# Patient Record
Sex: MALE | Race: WHITE | NOT HISPANIC OR LATINO | Employment: FULL TIME | URBAN - METROPOLITAN AREA
[De-identification: names, ages, dates, MRNs, and addresses within clinical notes are randomized per-mention and may not be internally consistent; named-entity substitution may affect disease eponyms.]

---

## 2017-01-13 ENCOUNTER — TELEPHONE (OUTPATIENT)
Dept: MEDICAL GROUP | Facility: MEDICAL CENTER | Age: 32
End: 2017-01-13

## 2017-01-18 NOTE — TELEPHONE ENCOUNTER
Called pt informed him that the letter he requested was ready. Pt would like to pick it up on 01/18/17 rather than having it mailed.

## 2017-03-10 ENCOUNTER — APPOINTMENT (OUTPATIENT)
Dept: ADMISSIONS | Facility: MEDICAL CENTER | Age: 32
End: 2017-03-10
Attending: OTOLARYNGOLOGY
Payer: MEDICAID

## 2017-03-15 ENCOUNTER — HOSPITAL ENCOUNTER (OUTPATIENT)
Facility: MEDICAL CENTER | Age: 32
End: 2017-03-15
Attending: OTOLARYNGOLOGY | Admitting: OTOLARYNGOLOGY
Payer: MEDICAID

## 2017-03-15 VITALS
HEIGHT: 68 IN | SYSTOLIC BLOOD PRESSURE: 121 MMHG | RESPIRATION RATE: 14 BRPM | DIASTOLIC BLOOD PRESSURE: 80 MMHG | TEMPERATURE: 97.1 F | OXYGEN SATURATION: 94 % | BODY MASS INDEX: 27.57 KG/M2 | WEIGHT: 181.88 LBS | HEART RATE: 65 BPM

## 2017-03-15 PROBLEM — D14.0: Status: ACTIVE | Noted: 2017-03-15

## 2017-03-15 PROCEDURE — 502240 HCHG MISC OR SUPPLY RC 0272: Performed by: OTOLARYNGOLOGY

## 2017-03-15 PROCEDURE — 500125 HCHG BOVIE, HANDLE: Performed by: OTOLARYNGOLOGY

## 2017-03-15 PROCEDURE — 160002 HCHG RECOVERY MINUTES (STAT): Performed by: OTOLARYNGOLOGY

## 2017-03-15 PROCEDURE — A9270 NON-COVERED ITEM OR SERVICE: HCPCS

## 2017-03-15 PROCEDURE — 700111 HCHG RX REV CODE 636 W/ 250 OVERRIDE (IP)

## 2017-03-15 PROCEDURE — 110382 HCHG SHELL REV 271: Performed by: OTOLARYNGOLOGY

## 2017-03-15 PROCEDURE — 700102 HCHG RX REV CODE 250 W/ 637 OVERRIDE(OP)

## 2017-03-15 PROCEDURE — 502573 HCHG PACK, ENT: Performed by: OTOLARYNGOLOGY

## 2017-03-15 PROCEDURE — 160035 HCHG PACU - 1ST 60 MINS PHASE I: Performed by: OTOLARYNGOLOGY

## 2017-03-15 PROCEDURE — 160009 HCHG ANES TIME/MIN: Performed by: OTOLARYNGOLOGY

## 2017-03-15 PROCEDURE — 160048 HCHG OR STATISTICAL LEVEL 1-5: Performed by: OTOLARYNGOLOGY

## 2017-03-15 PROCEDURE — 88305 TISSUE EXAM BY PATHOLOGIST: CPT | Mod: 59

## 2017-03-15 PROCEDURE — 500126 HCHG BOVIE, NEEDLE TIP: Performed by: OTOLARYNGOLOGY

## 2017-03-15 PROCEDURE — 700101 HCHG RX REV CODE 250

## 2017-03-15 PROCEDURE — A4606 OXYGEN PROBE USED W OXIMETER: HCPCS | Performed by: OTOLARYNGOLOGY

## 2017-03-15 PROCEDURE — 160028 HCHG SURGERY MINUTES - 1ST 30 MINS LEVEL 3: Performed by: OTOLARYNGOLOGY

## 2017-03-15 PROCEDURE — 110371 HCHG SHELL REV 272: Performed by: OTOLARYNGOLOGY

## 2017-03-15 PROCEDURE — 160036 HCHG PACU - EA ADDL 30 MINS PHASE I: Performed by: OTOLARYNGOLOGY

## 2017-03-15 RX ORDER — HYDROCODONE BITARTRATE AND ACETAMINOPHEN 5; 325 MG/1; MG/1
1-2 TABLET ORAL EVERY 4 HOURS PRN
Qty: 20 TAB | Refills: 0 | Status: ON HOLD | OUTPATIENT
Start: 2017-03-15 | End: 2017-07-19

## 2017-03-15 RX ORDER — SODIUM CHLORIDE, SODIUM LACTATE, POTASSIUM CHLORIDE, CALCIUM CHLORIDE 600; 310; 30; 20 MG/100ML; MG/100ML; MG/100ML; MG/100ML
1000 INJECTION, SOLUTION INTRAVENOUS
Status: COMPLETED | OUTPATIENT
Start: 2017-03-15 | End: 2017-03-15

## 2017-03-15 RX ORDER — LIDOCAINE HYDROCHLORIDE AND EPINEPHRINE 10; 10 MG/ML; UG/ML
INJECTION, SOLUTION INFILTRATION; PERINEURAL
Status: DISCONTINUED
Start: 2017-03-15 | End: 2017-03-15 | Stop reason: HOSPADM

## 2017-03-15 RX ORDER — LIDOCAINE HYDROCHLORIDE AND EPINEPHRINE 10; 10 MG/ML; UG/ML
INJECTION, SOLUTION INFILTRATION; PERINEURAL
Status: DISCONTINUED | OUTPATIENT
Start: 2017-03-15 | End: 2017-03-15 | Stop reason: HOSPADM

## 2017-03-15 RX ADMIN — SODIUM CHLORIDE, SODIUM LACTATE, POTASSIUM CHLORIDE, CALCIUM CHLORIDE 1000 ML: 600; 310; 30; 20 INJECTION, SOLUTION INTRAVENOUS at 07:56

## 2017-03-15 ASSESSMENT — PAIN SCALES - GENERAL
PAINLEVEL_OUTOF10: 0

## 2017-03-15 NOTE — OP REPORT
DATE OF SERVICE:  03/15/2017    PREOPERATIVE DIAGNOSIS:  Left nasal papillomatosis.    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    PROCEDURES:  Bilateral nasal endoscopy, right middle meatus polyp removal and   left excision of papilloma with cautery of base.    DESCRIPTION OF PROCEDURE:  The patient was given general anesthesia, properly   anesthetized, LMA was placed, had removal of a large nasal papilloma, mostly   emanated from the septum and the inferior floor of the nose.  It was   completely excised and then the base was cauterized.  There was slight   bleeding, no more than 10 mL altogether.  The right side then had a nasal   endoscopy done and there was a small polyp at the anterior middle meatus, it   was removed.  I did not see papilloma per se and I did not see any findings   otherwise of sinusitis such as drainage other polyp, etc.  Left nasal   endoscopy was done, the middle meatus appeared to be normal and there were no   lesions.  The papilloma was relatively anterior and had been removed and   cauterized.  At this point, there was no further active bleeding.  She was   taken to recovery room in excellent condition.       ____________________________________     MD MARCK ORTA / SARAH    DD:  03/15/2017 09:33:47  DT:  03/15/2017 09:53:24    D#:  [id number]  Job#:  [id number]

## 2017-03-15 NOTE — PROGRESS NOTES
0932-received pt from OR with report from Dr Mcarthur. VSS. Oral airway in place, respirations unlabored. No bleeding/ drainage noted from nose.  1010-oral airway dc'd, HOB elevated. Pt drowsy, denies pain.  1040-pt continues to deny pain.  Weaned to RA.   1050-friend to bedside  1100-pt awake, alert, no bleeding/ drainage from nose.  Drip pad placed. Instructions reviewed with pt and friend express understanding  1115-pt expresses readiness for discharge, meets criteria. Dressed, IV dc'd. Discharged via wheelchair at 1120

## 2017-03-15 NOTE — OR SURGEON
Immediate Post-Operative Note      PreOp Diagnosis: Left nasal papilloma    PostOp Diagnosis: left nasal papilloma, right nasal polyp    Procedure(s): Bilateral nasal endoscopy, right polyp removal  MASS EXCISION GENERAL FOR PAPILLOMA - Wound Class: Clean Contaminated  EXAM UNDER ANESTHESIA SIDE OF NOSE - Wound Class: Clean Contaminated  NASAL POLYPECTOMY - Wound Class: Clean Contaminated    Surgeon(s):  Lobito Friedman M.D.    Anesthesiologist/Type of Anesthesia:  Anesthesiologist: Tyron Mcarthur M.D./General    Surgical Staff:  Circulator: Sherry Jacobo R.N.  Relief Circulator: Aye Sanchez R.N.  Relief Scrub: Raiza Keith R.N.  Scrub Person: Babs Garcia    Specimen: left nasal papilloma, right nasal polyp    Estimated Blood Loss: 10 cc    Findings: as above    Complications: none        3/15/2017 9:30 AM Lobito Friedman

## 2017-03-15 NOTE — IP AVS SNAPSHOT
3/15/2017          Jevon Mcclendon  [address]  [place] [address]    Dear Jevon:    [facility] wants to ensure your discharge home is safe and you or your loved ones have had all your questions answered regarding your care after you leave the hospital.    You may receive a telephone call within two days of your discharge.  This call is to make certain you understand your discharge instructions as well as ensure we provided you with the best care possible during your stay with us.     The call will only last approximately 3-5 minutes and will be done by a nurse.    Once again, we want to ensure your discharge home is safe and that you have a clear understanding of any next steps in your care.  If you have any questions or concerns, please do not hesitate to contact us, we are here for you.  Thank you for choosing [facility] for your healthcare needs.    Sincerely,    Mc Chanel    [facility]

## 2017-03-15 NOTE — IP AVS SNAPSHOT
" Home Care Instructions                                                                                                                Name:Jevon Mcclendon  Medical Record Number:[id number]  CSN: [id number]    YOB: 1985   Age: 31 y.o.  Sex: male  HT:1.727 m (5' 8\") WT: 82.5 kg (181 lb 14.1 oz)          Admit Date: 3/15/2017     Discharge Date:   Today's Date: 3/15/2017  Attending Doctor:  Lobito Friedman M.D.                  Allergies:  Clindamycin                Discharge Instructions         ACTIVITY: Rest and take it easy for the first 24 hours.  A responsible adult is recommended to remain with you during that time.  It is normal to feel sleepy.  We encourage you to not do anything that requires balance, judgment or coordination.    MILD FLU-LIKE SYMPTOMS ARE NORMAL. YOU MAY EXPERIENCE GENERALIZED MUSCLE ACHES, THROAT IRRITATION, HEADACHE AND/OR SOME NAUSEA.    FOR 24 HOURS DO NOT:  Drive, operate machinery or run household appliances.  Drink beer or alcoholic beverages.   Make important decisions or sign legal documents.    SPECIAL INSTRUCTIONS: *Return to ER for SOB, serious bleeding or dehydration.  keep upright for 24 to 48 hours. Notify MD for any problems**    DIET: To avoid nausea, slowly advance diet as tolerated, avoiding spicy or greasy foods for the first day.  Add more substantial food to your diet according to your physician's instructions.  Babies can be fed formula or breast milk as soon as they are hungry.  INCREASE FLUIDS AND FIBER TO AVOID CONSTIPATION.    SURGICAL DRESSING/BATHING:*change drip pad as needed, no nose blowing for 72 hours**    FOLLOW-UP APPOINTMENT:  A follow-up appointment should be arranged with your doctor in *2 weeks**; call to schedule.    You should CALL YOUR PHYSICIAN if you develop:  Fever greater than 101 degrees F.  Pain not relieved by medication, or persistent nausea or vomiting.  Excessive bleeding (blood soaking through dressing) or unexpected " drainage from the wound.  Extreme redness or swelling around the incision site, drainage of pus or foul smelling drainage.  Inability to urinate or empty your bladder within 8 hours.  Problems with breathing or chest pain.    You should call 911 if you develop problems with breathing or chest pain.  If you are unable to contact your doctor or surgical center, you should go to the nearest emergency room or urgent care center.  Physician's telephone #: **[phone]*    If any questions arise, call your doctor.  If your doctor is not available, please feel free to call the Surgical Center at [phone].  The Center is open Monday through Friday from 7AM to 7PM.  You can also call the HEALTH HOTLINE open 24 hours/day, 7 days/week and speak to a nurse at [phone], or toll free at [phone].    A registered nurse may call you a few days after your surgery to see how you are doing after your procedure.    MEDICATIONS: Resume taking daily medication.  Take prescribed pain medication with food.  If no medication is prescribed, you may take non-aspirin pain medication if needed.  PAIN MEDICATION CAN BE VERY CONSTIPATING.  Take a stool softener or laxative such as senokot, pericolace, or milk of magnesia if needed.    Prescription given for **Lortab*.  Last pain medication given at *_________**.    If your physician has prescribed pain medication that includes Acetaminophen (Tylenol), do not take additional Acetaminophen (Tylenol) while taking the prescribed medication.    Depression / Suicide Risk    As you are discharged from this [facility] facility, it is important to learn how to keep safe from harming yourself.    Recognize the warning signs:  · Abrupt changes in personality, positive or negative- including increase in energy   · Giving away possessions  · Change in eating patterns- significant weight changes-  positive or negative  · Change in sleeping patterns- unable to sleep or sleeping all the  time   · Unwillingness or inability to communicate  · Depression  · Unusual sadness, discouragement and loneliness  · Talk of wanting to die  · Neglect of personal appearance   · Rebelliousness- reckless behavior  · Withdrawal from people/activities they love  · Confusion- inability to concentrate     If you or a loved one observes any of these behaviors or has concerns about self-harm, here's what you can do:  · Talk about it- your feelings and reasons for harming yourself  · Remove any means that you might use to hurt yourself (examples: pills, rope, extension cords, firearm)  · Get professional help from the community (Mental Health, Substance Abuse, psychological counseling)  · Do not be alone:Call your Safe Contact- someone whom you trust who will be there for you.  · Call your local CRISIS HOTLINE 780-8604 or 486-675-1337  · Call your local Children's Mobile Crisis Response Team [place] (903) 809-8548 or www.[employer]  · Call the toll free National Suicide Prevention Hotlines   · National Suicide Prevention Lifeline 757-091-XTKV (8204)  · Next One's On Me (NO[facility]) Hope Line Network 800-SUICIDE (794-6614)       Medication List      START taking these medications        Instructions    hydrocodone-acetaminophen 5-325 MG Tabs per tablet   Commonly known as:  LORTAB    Take 1-2 Tabs by mouth every four hours as needed.   Dose:  1-2 Tab         CONTINUE taking these medications        Instructions    acetaminophen/caffeine/butalbital 325-40-50 mg -40 MG Tabs   Commonly known as:  FIORICET    Take 1 Tab by mouth every 6 hours as needed for Migraine.   Dose:  1 Tab       amitriptyline 25 MG Tabs   Commonly known as:  ELAVIL    Take 1 Tab by mouth every bedtime. To help prevent headaches.   Dose:  25 mg       loratadine 10 MG Tabs   Commonly known as:  CLARITIN    Take 1 Tab by mouth every day.   Dose:  10 mg               Medication Information     Above and/or attached are the medications your physician expects  you to take upon discharge. Review all of your home medications and newly ordered medications with your doctor and/or pharmacist. Follow medication instructions as directed by your doctor and/or pharmacist. Please keep your medication list with you and share with your physician. Update the information when medications are discontinued, doses are changed, or new medications (including over-the-counter products) are added; and carry medication information at all times in the event of emergency situations.        Resources     Quit Smoking / Tobacco Use:    I understand the use of any tobacco products increases my chance of suffering from future heart disease or stroke and could cause other illnesses which may shorten my life. Quitting the use of tobacco products is the single most important thing I can do to improve my health. For further information on smoking / tobacco cessation call a Toll Free Quit Line at 1-617.375.7789 (*[employer]) or 1-942.254.2710 (American Lung Association) or you can access the web based program at www.lungusa.org.    [place] Tobacco Users Help Line:  (711) 111-1099       Toll Free: 1-545.695.4622  Quit Tobacco Program [facility] (947)352-7679    Crisis Hotline:    [place] Crisis Hotline:  1-863-ETOFBLP or 1-159.183.1911    [place] Crisis Hotline:    1-722.848.8215 or 523-488-9571    Discharge Survey:   Thank you for choosing [facility]. We hope we did everything we could to make your hospital stay a pleasant one. You may be receiving a survey and we would appreciate your time and participation in answering the questions. Your input is very valuable to us in our efforts to improve our service to our patients and their families.            Signatures     My signature on this form indicates that:    1. I acknowledge receipt and understanding of these Home Care Instruction.  2. My questions regarding this information have been answered to my  satisfaction.  3. I have formulated a plan with my discharge nurse to obtain my prescribed medications for home.    __________________________________      __________________________________                   Patient Signature                                 Guardian/Responsible Adult Signature      __________________________________                 __________       ________                       Nurse Signature                                               Date                 Time

## 2017-07-17 ENCOUNTER — APPOINTMENT (OUTPATIENT)
Dept: ADMISSIONS | Facility: MEDICAL CENTER | Age: 32
End: 2017-07-17
Attending: OTOLARYNGOLOGY
Payer: MEDICAID

## 2017-07-19 ENCOUNTER — HOSPITAL ENCOUNTER (OUTPATIENT)
Facility: MEDICAL CENTER | Age: 32
End: 2017-07-19
Attending: OTOLARYNGOLOGY | Admitting: OTOLARYNGOLOGY
Payer: MEDICAID

## 2017-07-19 VITALS
RESPIRATION RATE: 16 BRPM | DIASTOLIC BLOOD PRESSURE: 84 MMHG | OXYGEN SATURATION: 96 % | HEIGHT: 68 IN | HEART RATE: 57 BPM | SYSTOLIC BLOOD PRESSURE: 127 MMHG | BODY MASS INDEX: 26.86 KG/M2 | TEMPERATURE: 97 F | WEIGHT: 177.25 LBS

## 2017-07-19 PROBLEM — J33.0 POLYP OF NASAL CAVITY: Status: ACTIVE | Noted: 2017-07-19

## 2017-07-19 PROCEDURE — 160027 HCHG SURGERY MINUTES - 1ST 30 MINS LEVEL 2: Performed by: OTOLARYNGOLOGY

## 2017-07-19 PROCEDURE — 88305 TISSUE EXAM BY PATHOLOGIST: CPT

## 2017-07-19 PROCEDURE — 160038 HCHG SURGERY MINUTES - EA ADDL 1 MIN LEVEL 2: Performed by: OTOLARYNGOLOGY

## 2017-07-19 PROCEDURE — 88304 TISSUE EXAM BY PATHOLOGIST: CPT

## 2017-07-19 PROCEDURE — 160035 HCHG PACU - 1ST 60 MINS PHASE I: Performed by: OTOLARYNGOLOGY

## 2017-07-19 PROCEDURE — 160048 HCHG OR STATISTICAL LEVEL 1-5: Performed by: OTOLARYNGOLOGY

## 2017-07-19 PROCEDURE — 700101 HCHG RX REV CODE 250

## 2017-07-19 PROCEDURE — 502240 HCHG MISC OR SUPPLY RC 0272: Performed by: OTOLARYNGOLOGY

## 2017-07-19 PROCEDURE — 160036 HCHG PACU - EA ADDL 30 MINS PHASE I: Performed by: OTOLARYNGOLOGY

## 2017-07-19 PROCEDURE — 501838 HCHG SUTURE GENERAL: Performed by: OTOLARYNGOLOGY

## 2017-07-19 PROCEDURE — 500331 HCHG COTTONOID, SURG PATTIE: Performed by: OTOLARYNGOLOGY

## 2017-07-19 PROCEDURE — 160009 HCHG ANES TIME/MIN: Performed by: OTOLARYNGOLOGY

## 2017-07-19 PROCEDURE — 700111 HCHG RX REV CODE 636 W/ 250 OVERRIDE (IP)

## 2017-07-19 PROCEDURE — 502573 HCHG PACK, ENT: Performed by: OTOLARYNGOLOGY

## 2017-07-19 PROCEDURE — 160002 HCHG RECOVERY MINUTES (STAT): Performed by: OTOLARYNGOLOGY

## 2017-07-19 PROCEDURE — 500125 HCHG BOVIE, HANDLE: Performed by: OTOLARYNGOLOGY

## 2017-07-19 RX ORDER — ONDANSETRON 8 MG/1
8 TABLET, ORALLY DISINTEGRATING ORAL EVERY 8 HOURS PRN
Qty: 15 TAB | Refills: 0 | Status: SHIPPED | OUTPATIENT
Start: 2017-07-19 | End: 2022-01-15

## 2017-07-19 RX ORDER — SODIUM CHLORIDE, SODIUM LACTATE, POTASSIUM CHLORIDE, CALCIUM CHLORIDE 600; 310; 30; 20 MG/100ML; MG/100ML; MG/100ML; MG/100ML
INJECTION, SOLUTION INTRAVENOUS CONTINUOUS
Status: DISCONTINUED | OUTPATIENT
Start: 2017-07-19 | End: 2017-07-19 | Stop reason: HOSPADM

## 2017-07-19 RX ORDER — MIDAZOLAM HYDROCHLORIDE 1 MG/ML
INJECTION INTRAMUSCULAR; INTRAVENOUS
Status: DISCONTINUED
Start: 2017-07-19 | End: 2017-07-19 | Stop reason: HOSPADM

## 2017-07-19 RX ORDER — EPINEPHRINE 1 MG/ML(1)
AMPUL (ML) INJECTION
Status: DISCONTINUED | OUTPATIENT
Start: 2017-07-19 | End: 2017-07-19 | Stop reason: HOSPADM

## 2017-07-19 RX ORDER — DIPHENHYDRAMINE HYDROCHLORIDE 50 MG/ML
INJECTION INTRAMUSCULAR; INTRAVENOUS
Status: DISCONTINUED
Start: 2017-07-19 | End: 2017-07-19 | Stop reason: HOSPADM

## 2017-07-19 RX ORDER — KETAMINE HYDROCHLORIDE 50 MG/ML
INJECTION, SOLUTION INTRAMUSCULAR; INTRAVENOUS
Status: DISCONTINUED
Start: 2017-07-19 | End: 2017-07-19 | Stop reason: HOSPADM

## 2017-07-19 RX ORDER — HYDROCODONE BITARTRATE AND ACETAMINOPHEN 5; 325 MG/1; MG/1
1-2 TABLET ORAL EVERY 4 HOURS PRN
Qty: 20 TAB | Refills: 0 | Status: SHIPPED | OUTPATIENT
Start: 2017-07-19 | End: 2022-01-15

## 2017-07-19 RX ADMIN — SODIUM CHLORIDE, SODIUM LACTATE, POTASSIUM CHLORIDE, CALCIUM CHLORIDE 1000 ML: 600; 310; 30; 20 INJECTION, SOLUTION INTRAVENOUS at 06:30

## 2017-07-19 ASSESSMENT — PAIN SCALES - GENERAL
PAINLEVEL_OUTOF10: 0

## 2017-07-19 NOTE — PROGRESS NOTES
0803-received pt from OR with report from Dr Mcarthur.  VSS.  Nasal trumpet used as oral airway in place, respirations unlabored.  Drip pad to nose, dry.    0843-airway dc'd.  HOB elevated, pt drowsy, denies pain  0900-pt denies pain. Tolerating PO  0915-sister called for ride  0950-pt dressed, resting in recliner, drip pad dry. Denies pain. Meets criteria for discharge, sister at side. Instructions reviewed, pt and sister express understanding.   0957-discharged home, ambulatory out with sister at side, steady on feet.

## 2017-07-19 NOTE — IP AVS SNAPSHOT
" Home Care Instructions                                                                                                                Name:Jevon Mcclendon  Medical Record Number:[id number]  CSN: [id number]    YOB: 1985   Age: 32 y.o.  Sex: male  HT:1.727 m (5' 7.99\") WT: 80.4 kg (177 lb 4 oz)          Admit Date: 7/19/2017     Discharge Date:   Today's Date: 7/19/2017  Attending Doctor:  Lobito Friedman M.D.                  Allergies:  Clindamycin                Discharge Instructions         ACTIVITY: Rest and take it easy for the first 24 hours.  A responsible adult is recommended to remain with you during that time.  It is normal to feel sleepy.  We encourage you to not do anything that requires balance, judgment or coordination.    MILD FLU-LIKE SYMPTOMS ARE NORMAL. YOU MAY EXPERIENCE GENERALIZED MUSCLE ACHES, THROAT IRRITATION, HEADACHE AND/OR SOME NAUSEA.    FOR 24 HOURS DO NOT:  Drive, operate machinery or run household appliances.  Drink beer or alcoholic beverages.   Make important decisions or sign legal documents.    SPECIAL INSTRUCTIONS: *Return to ER for SOB, serious bleeding or dehydration.  FU 2 weeks; may call for other problems, do not blow nose for 72 hours, keep upright for 24 to 48 hours. No strenuous activity or heavy lifting for at least one week**    DIET: To avoid nausea, slowly advance diet as tolerated, avoiding spicy or greasy foods for the first day.  Add more substantial food to your diet according to your physician's instructions.  Babies can be fed formula or breast milk as soon as they are hungry.  INCREASE FLUIDS AND FIBER TO AVOID CONSTIPATION.    SURGICAL DRESSING/BATHING: **change drip pad as needed, do not blow nose for 72 hours.  Avoid excessively hot showers/ baths*    FOLLOW-UP APPOINTMENT:  A follow-up appointment should be arranged with your doctor in **2 weeks*; call to schedule.    You should CALL YOUR PHYSICIAN if you develop:  Fever greater than 101 " degrees F.  Pain not relieved by medication, or persistent nausea or vomiting.  Excessive bleeding (blood soaking through dressing) or unexpected drainage from the wound.  Extreme redness or swelling around the incision site, drainage of pus or foul smelling drainage.  Inability to urinate or empty your bladder within 8 hours.  Problems with breathing or chest pain.    You should call 911 if you develop problems with breathing or chest pain.  If you are unable to contact your doctor or surgical center, you should go to the nearest emergency room or urgent care center.  Physician's telephone #: **[phone]*    If any questions arise, call your doctor.  If your doctor is not available, please feel free to call the Surgical Center at [phone].  The Center is open Monday through Friday from 7AM to 7PM.  You can also call the [employer] HOTLINE open 24 hours/day, 7 days/week and speak to a nurse at [phone], or toll free at [phone].    A registered nurse may call you a few days after your surgery to see how you are doing after your procedure.    MEDICATIONS: Resume taking daily medication.  Take prescribed pain medication with food.  If no medication is prescribed, you may take non-aspirin pain medication if needed.  PAIN MEDICATION CAN BE VERY CONSTIPATING.  Take a stool softener or laxative such as senokot, pericolace, or milk of magnesia if needed.    Prescription given for *Norco & Zofran**.  Last pain medication given at *___________**.    If your physician has prescribed pain medication that includes Acetaminophen (Tylenol), do not take additional Acetaminophen (Tylenol) while taking the prescribed medication.    Depression / Suicide Risk    As you are discharged from this [facility] facility, it is important to learn how to keep safe from harming yourself.    Recognize the warning signs:  · Abrupt changes in personality, positive or negative- including increase in energy   · Giving away  possessions  · Change in eating patterns- significant weight changes-  positive or negative  · Change in sleeping patterns- unable to sleep or sleeping all the time   · Unwillingness or inability to communicate  · Depression  · Unusual sadness, discouragement and loneliness  · Talk of wanting to die  · Neglect of personal appearance   · Rebelliousness- reckless behavior  · Withdrawal from people/activities they love  · Confusion- inability to concentrate     If you or a loved one observes any of these behaviors or has concerns about self-harm, here's what you can do:  · Talk about it- your feelings and reasons for harming yourself  · Remove any means that you might use to hurt yourself (examples: pills, rope, extension cords, firearm)  · Get professional help from the community (Mental Health, Substance Abuse, psychological counseling)  · Do not be alone:Call your Safe Contact- someone whom you trust who will be there for you.  · Call your local CRISIS HOTLINE 902-8938 or 287-656-4552  · Call your local Children's Mobile Crisis Response Team [place] (839) 853-0081 or wwwViaView  · Call the toll free National Suicide Prevention Hotlines   · National Suicide Prevention Lifeline 173-525-DPPS (8071)  · National Hope Line Network 800-SUICIDE (827-5070)       Medication List      START taking these medications        Instructions    Morning Afternoon Evening Bedtime    hydrocodone-acetaminophen 5-325 MG Tabs per tablet   Commonly known as:  NORCO        Take 1-2 Tabs by mouth every four hours as needed.   Dose:  1-2 Tab                        ondansetron 8 MG Tbdp   Commonly known as:  ZOFRAN ODT        Take 1 Tab by mouth every 8 hours as needed for Nausea/Vomiting.   Dose:  8 mg                          CONTINUE taking these medications        Instructions    Morning Afternoon Evening Bedtime    loratadine 10 MG Tabs   Commonly known as:  CLARITIN        Take 1 Tab by mouth every day.   Dose:  10 mg                              Where to Get Your Medications      Information about where to get these medications is not yet available     ! Ask your nurse or doctor about these medications    - hydrocodone-acetaminophen 5-325 MG Tabs per tablet  - ondansetron 8 MG Tbdp            Medication Information     Above and/or attached are the medications your physician expects you to take upon discharge. Review all of your home medications and newly ordered medications with your doctor and/or pharmacist. Follow medication instructions as directed by your doctor and/or pharmacist. Please keep your medication list with you and share with your physician. Update the information when medications are discontinued, doses are changed, or new medications (including over-the-counter products) are added; and carry medication information at all times in the event of emergency situations.        Resources     Quit Smoking / Tobacco Use:    I understand the use of any tobacco products increases my chance of suffering from future heart disease or stroke and could cause other illnesses which may shorten my life. Quitting the use of tobacco products is the single most important thing I can do to improve my health. For further information on smoking / tobacco cessation call a Toll Free Quit Line at 1-355.890.6814 (*National Cancer [place]) or 1-339.277.7750 (American Lung Association) or you can access the web based program at www.lungusa.org.    [place] Tobacco Users Help Line:  (471) 356-8099       Toll Free: 1-446.259.9167  Quit Tobacco Program [facility] (903)151-2032    Crisis Hotline:    [place] Crisis Hotline:  3-040-ZHDCVYH or 1-409.246.4316    [place] Crisis Hotline:    1-121.560.7328 or 515-866-9497    Discharge Survey:   Thank you for choosing [facility]. We hope we did everything we could to make your hospital stay a pleasant one. You may be receiving a survey and we would appreciate your time and  participation in answering the questions. Your input is very valuable to us in our efforts to improve our service to our patients and their families.            Signatures     My signature on this form indicates that:    1. I acknowledge receipt and understanding of these Home Care Instruction.  2. My questions regarding this information have been answered to my satisfaction.  3. I have formulated a plan with my discharge nurse to obtain my prescribed medications for home.    __________________________________      __________________________________                   Patient Signature                                 Guardian/Responsible Adult Signature      __________________________________                 __________       ________                       Nurse Signature                                               Date                 Time

## 2017-07-19 NOTE — IP AVS SNAPSHOT
7/19/2017    Jevon Mcclendon  [address]  [place] [address]    Dear Jevon:    [facility] wants to ensure your discharge home is safe and you or your loved ones have had all of your questions answered regarding your care after you leave the hospital.    Below is a list of resources and contact information should you have any questions regarding your hospital stay, follow-up instructions, or active medical symptoms.    Questions or Concerns Regarding… Contact   Medical Questions Related to Your Discharge  (7 days a week, 8am-5pm) Contact a Nurse Care Coordinator   [phone]   Medical Questions Not Related to Your Discharge  (24 hours a day / 7 days a week)  Contact the Nurse Health Line   [phone]    Medications or Discharge Instructions Refer to your discharge packet   or contact your [facility] Primary Care Provider   [phone]   Follow-up Appointment(s) Schedule your appointment via [employer]   or contact Scheduling [phone]   Billing Review your statement via [employer]  or contact Billing [phone]   Medical Records Review your records via [employer]   or contact Medical Records [phone]     You may receive a telephone call within two days of discharge. This call is to make certain you understand your discharge instructions and have the opportunity to have any questions answered. You can also easily access your medical information, test results and upcoming appointments via the [employer] free online health management tool. You can learn more and sign up at [employer]. For assistance setting up your [employer] account, please call [phone].    Once again, we want to ensure your discharge home is safe and that you have a clear understanding of any next steps in your care. If you have any questions or concerns, please do not hesitate to contact us, we are here for you. Thank you for choosing [facility] for your healthcare needs.    Sincerely,    Your [facility] Healthcare Team

## 2017-07-19 NOTE — DISCHARGE INSTRUCTIONS
ACTIVITY: Rest and take it easy for the first 24 hours.  A responsible adult is recommended to remain with you during that time.  It is normal to feel sleepy.  We encourage you to not do anything that requires balance, judgment or coordination.    MILD FLU-LIKE SYMPTOMS ARE NORMAL. YOU MAY EXPERIENCE GENERALIZED MUSCLE ACHES, THROAT IRRITATION, HEADACHE AND/OR SOME NAUSEA.    FOR 24 HOURS DO NOT:  Drive, operate machinery or run household appliances.  Drink beer or alcoholic beverages.   Make important decisions or sign legal documents.    SPECIAL INSTRUCTIONS: *Return to ER for SOB, serious bleeding or dehydration.  FU 2 weeks; may call for other problems, do not blow nose for 72 hours, keep upright for 24 to 48 hours. No strenuous activity or heavy lifting for at least one week**    DIET: To avoid nausea, slowly advance diet as tolerated, avoiding spicy or greasy foods for the first day.  Add more substantial food to your diet according to your physician's instructions.  Babies can be fed formula or breast milk as soon as they are hungry.  INCREASE FLUIDS AND FIBER TO AVOID CONSTIPATION.    SURGICAL DRESSING/BATHING: **change drip pad as needed, do not blow nose for 72 hours.  Avoid excessively hot showers/ baths*    FOLLOW-UP APPOINTMENT:  A follow-up appointment should be arranged with your doctor in **2 weeks*; call to schedule.    You should CALL YOUR PHYSICIAN if you develop:  Fever greater than 101 degrees F.  Pain not relieved by medication, or persistent nausea or vomiting.  Excessive bleeding (blood soaking through dressing) or unexpected drainage from the wound.  Extreme redness or swelling around the incision site, drainage of pus or foul smelling drainage.  Inability to urinate or empty your bladder within 8 hours.  Problems with breathing or chest pain.    You should call 911 if you develop problems with breathing or chest pain.  If you are unable to contact your doctor or surgical center, you  should go to the nearest emergency room or urgent care center.  Physician's telephone #: **[phone]*    If any questions arise, call your doctor.  If your doctor is not available, please feel free to call the Surgical Center at [phone].  The Center is open Monday through Friday from 7AM to 7PM.  You can also call the HEALTH HOTLINE open 24 hours/day, 7 days/week and speak to a nurse at [phone], or toll free at [phone].    A registered nurse may call you a few days after your surgery to see how you are doing after your procedure.    MEDICATIONS: Resume taking daily medication.  Take prescribed pain medication with food.  If no medication is prescribed, you may take non-aspirin pain medication if needed.  PAIN MEDICATION CAN BE VERY CONSTIPATING.  Take a stool softener or laxative such as senokot, pericolace, or milk of magnesia if needed.    Prescription given for *Norco & Zofran**.  Last pain medication given at *___________**.    If your physician has prescribed pain medication that includes Acetaminophen (Tylenol), do not take additional Acetaminophen (Tylenol) while taking the prescribed medication.    Depression / Suicide Risk    As you are discharged from this [facility] facility, it is important to learn how to keep safe from harming yourself.    Recognize the warning signs:  · Abrupt changes in personality, positive or negative- including increase in energy   · Giving away possessions  · Change in eating patterns- significant weight changes-  positive or negative  · Change in sleeping patterns- unable to sleep or sleeping all the time   · Unwillingness or inability to communicate  · Depression  · Unusual sadness, discouragement and loneliness  · Talk of wanting to die  · Neglect of personal appearance   · Rebelliousness- reckless behavior  · Withdrawal from people/activities they love  · Confusion- inability to concentrate     If you or a loved one observes any of these behaviors or has  concerns about self-harm, here's what you can do:  · Talk about it- your feelings and reasons for harming yourself  · Remove any means that you might use to hurt yourself (examples: pills, rope, extension cords, firearm)  · Get professional help from the community (Mental Health, Substance Abuse, psychological counseling)  · Do not be alone:Call your Safe Contact- someone whom you trust who will be there for you.  · Call your local CRISIS HOTLINE 517-8458 or 946-668-3035  · Call your local Children's Mobile Crisis Response Team [place] (721) 457-5174 or www.[facility]  · Call the toll free National Suicide Prevention Hotlines   · National Suicide Prevention Lifeline 960-916-WYOB (7967)  · National Hope Line Network 800-SUICIDE (439-9823)

## 2017-07-19 NOTE — OR SURGEON
Operative Report    PreOp Diagnosis: Nasal papillomatosis, left, allergic rhinitis    PostOp Diagnosis: same plus small polyps at middle meatus bilateral.    Procedure(s):  NASAL ENDOSCOPY, bilateral & EXCISION NASAL PAPILLOMAS left, nasal polypectomies endoscopic bilateral - Wound Class: Clean Contaminated    Surgeon(s):  Lobito Friedman M.D.    Anesthesiologist/Type of Anesthesia:  Anesthesiologist: Tyron Mcarthur M.D./General    Surgical Staff:  Circulator: Ada James R.N.  Scrub Person: Negar Crane    Specimens:  * No specimens in log *    Estimated Blood Loss: 10 cc    Findings: as above    Complications: none        7/19/2017 8:01 AM Lobito Friedman

## 2017-07-19 NOTE — OP REPORT
Operative Report    Date: 7/19/2017    Surgeon: Lobito Friedman    Assistant: None    Anesthesiologist: Dr. 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 also right hamartoma of middle meatus previous excision, history of allergic rhinitis    Findings: Bilateral anterior middle meatus polyps and left extensive nasal papillomatosis    Procedure in detail: Patient was given general anesthesia and LMA was placed. had extensive nasal papillomas filling the anterior nares on the left. They were excised and cautery was done of the bases. cautery set at 30 and used at 30 throughout. at some point there were no further papillomas and the nose was clear. he also had nasal endoscopy bilaterally. Nasal endoscopy did reveal middle meatus what appeared to be small polyps of the anterior middle meatus. However there is a history of hamartoma with a small lesion removed that looked like a polyp previously, it was benign, called to hamartoma, these looked more like polyps. They were removed and submitted to pathology as right and left polyps. there was no further bleeding no further problems. Was taken to the recovery room in good condition. blood loss was approximately 10 mL. cautery was placed at 30 and use mostly to cauterize the base of the papillomas on both sides minimal cautery was used at the right middle meatus, No cut was used.    EBL: 10 cc    UOP: Not monitored    Drains: None    Dispo: To recovery room in good condition

## 2018-06-17 ENCOUNTER — HOSPITAL ENCOUNTER (OUTPATIENT)
Dept: RADIOLOGY | Facility: MEDICAL CENTER | Age: 33
End: 2018-06-17
Attending: OTOLARYNGOLOGY
Payer: MEDICAID

## 2018-06-17 DIAGNOSIS — J32.9 CHRONIC SINUSITIS, UNSPECIFIED LOCATION: ICD-10-CM

## 2018-06-17 DIAGNOSIS — J31.0 CHRONIC RHINITIS: ICD-10-CM

## 2018-06-17 PROCEDURE — 70486 CT MAXILLOFACIAL W/O DYE: CPT

## 2020-06-05 ENCOUNTER — APPOINTMENT (OUTPATIENT)
Dept: RADIOLOGY | Facility: MEDICAL CENTER | Age: 35
End: 2020-06-05
Attending: EMERGENCY MEDICINE
Payer: MEDICAID

## 2020-06-05 ENCOUNTER — HOSPITAL ENCOUNTER (EMERGENCY)
Facility: MEDICAL CENTER | Age: 35
End: 2020-06-05
Attending: EMERGENCY MEDICINE
Payer: MEDICAID

## 2020-06-05 VITALS
HEART RATE: 86 BPM | BODY MASS INDEX: 29.8 KG/M2 | HEIGHT: 68 IN | TEMPERATURE: 96.7 F | DIASTOLIC BLOOD PRESSURE: 75 MMHG | RESPIRATION RATE: 16 BRPM | OXYGEN SATURATION: 97 % | SYSTOLIC BLOOD PRESSURE: 118 MMHG | WEIGHT: 196.65 LBS

## 2020-06-05 DIAGNOSIS — R10.32 LEFT LOWER QUADRANT ABDOMINAL PAIN: ICD-10-CM

## 2020-06-05 LAB
ALBUMIN SERPL BCP-MCNC: 4.3 G/DL (ref 3.2–4.9)
ALBUMIN/GLOB SERPL: 1.3 G/DL
ALP SERPL-CCNC: 92 U/L (ref 30–99)
ALT SERPL-CCNC: 38 U/L (ref 2–50)
ANION GAP SERPL CALC-SCNC: 16 MMOL/L (ref 7–16)
APPEARANCE UR: CLEAR
AST SERPL-CCNC: 26 U/L (ref 12–45)
BASOPHILS # BLD AUTO: 0.6 % (ref 0–1.8)
BASOPHILS # BLD: 0.06 K/UL (ref 0–0.12)
BILIRUB SERPL-MCNC: 0.4 MG/DL (ref 0.1–1.5)
BILIRUB UR QL STRIP.AUTO: NEGATIVE
BUN SERPL-MCNC: 16 MG/DL (ref 8–22)
CALCIUM SERPL-MCNC: 8.9 MG/DL (ref 8.5–10.5)
CHLORIDE SERPL-SCNC: 101 MMOL/L (ref 96–112)
CO2 SERPL-SCNC: 21 MMOL/L (ref 20–33)
COLOR UR: YELLOW
CREAT SERPL-MCNC: 0.81 MG/DL (ref 0.5–1.4)
EOSINOPHIL # BLD AUTO: 0.72 K/UL (ref 0–0.51)
EOSINOPHIL NFR BLD: 7.2 % (ref 0–6.9)
ERYTHROCYTE [DISTWIDTH] IN BLOOD BY AUTOMATED COUNT: 38.9 FL (ref 35.9–50)
GLOBULIN SER CALC-MCNC: 3.2 G/DL (ref 1.9–3.5)
GLUCOSE SERPL-MCNC: 122 MG/DL (ref 65–99)
GLUCOSE UR STRIP.AUTO-MCNC: NEGATIVE MG/DL
HCT VFR BLD AUTO: 47.5 % (ref 42–52)
HGB BLD-MCNC: 16 G/DL (ref 14–18)
IMM GRANULOCYTES # BLD AUTO: 0.03 K/UL (ref 0–0.11)
IMM GRANULOCYTES NFR BLD AUTO: 0.3 % (ref 0–0.9)
KETONES UR STRIP.AUTO-MCNC: NEGATIVE MG/DL
LEUKOCYTE ESTERASE UR QL STRIP.AUTO: NEGATIVE
LIPASE SERPL-CCNC: 46 U/L (ref 11–82)
LYMPHOCYTES # BLD AUTO: 2.23 K/UL (ref 1–4.8)
LYMPHOCYTES NFR BLD: 22.4 % (ref 22–41)
MCH RBC QN AUTO: 28.9 PG (ref 27–33)
MCHC RBC AUTO-ENTMCNC: 33.7 G/DL (ref 33.7–35.3)
MCV RBC AUTO: 85.9 FL (ref 81.4–97.8)
MICRO URNS: NORMAL
MONOCYTES # BLD AUTO: 0.6 K/UL (ref 0–0.85)
MONOCYTES NFR BLD AUTO: 6 % (ref 0–13.4)
NEUTROPHILS # BLD AUTO: 6.3 K/UL (ref 1.82–7.42)
NEUTROPHILS NFR BLD: 63.5 % (ref 44–72)
NITRITE UR QL STRIP.AUTO: NEGATIVE
NRBC # BLD AUTO: 0 K/UL
NRBC BLD-RTO: 0 /100 WBC
PH UR STRIP.AUTO: 5.5 [PH] (ref 5–8)
PLATELET # BLD AUTO: 330 K/UL (ref 164–446)
PMV BLD AUTO: 9.3 FL (ref 9–12.9)
POTASSIUM SERPL-SCNC: 3.7 MMOL/L (ref 3.6–5.5)
PROT SERPL-MCNC: 7.5 G/DL (ref 6–8.2)
PROT UR QL STRIP: NEGATIVE MG/DL
RBC # BLD AUTO: 5.53 M/UL (ref 4.7–6.1)
RBC UR QL AUTO: NEGATIVE
SODIUM SERPL-SCNC: 138 MMOL/L (ref 135–145)
SP GR UR STRIP.AUTO: >1.045
UROBILINOGEN UR STRIP.AUTO-MCNC: 1 MG/DL
WBC # BLD AUTO: 9.9 K/UL (ref 4.8–10.8)

## 2020-06-05 PROCEDURE — 700111 HCHG RX REV CODE 636 W/ 250 OVERRIDE (IP): Performed by: EMERGENCY MEDICINE

## 2020-06-05 PROCEDURE — 80053 COMPREHEN METABOLIC PANEL: CPT

## 2020-06-05 PROCEDURE — 81003 URINALYSIS AUTO W/O SCOPE: CPT

## 2020-06-05 PROCEDURE — 700117 HCHG RX CONTRAST REV CODE 255: Performed by: EMERGENCY MEDICINE

## 2020-06-05 PROCEDURE — 96374 THER/PROPH/DIAG INJ IV PUSH: CPT | Mod: XU

## 2020-06-05 PROCEDURE — 99285 EMERGENCY DEPT VISIT HI MDM: CPT

## 2020-06-05 PROCEDURE — 74177 CT ABD & PELVIS W/CONTRAST: CPT

## 2020-06-05 PROCEDURE — 83690 ASSAY OF LIPASE: CPT

## 2020-06-05 PROCEDURE — 85025 COMPLETE CBC W/AUTO DIFF WBC: CPT

## 2020-06-05 PROCEDURE — A9270 NON-COVERED ITEM OR SERVICE: HCPCS | Performed by: EMERGENCY MEDICINE

## 2020-06-05 PROCEDURE — 700102 HCHG RX REV CODE 250 W/ 637 OVERRIDE(OP): Performed by: EMERGENCY MEDICINE

## 2020-06-05 RX ORDER — ACETAMINOPHEN 500 MG
1000 TABLET ORAL ONCE
Status: COMPLETED | OUTPATIENT
Start: 2020-06-05 | End: 2020-06-05

## 2020-06-05 RX ORDER — KETOROLAC TROMETHAMINE 30 MG/ML
15 INJECTION, SOLUTION INTRAMUSCULAR; INTRAVENOUS ONCE
Status: COMPLETED | OUTPATIENT
Start: 2020-06-05 | End: 2020-06-05

## 2020-06-05 RX ORDER — OXYCODONE HYDROCHLORIDE 5 MG/1
5 TABLET ORAL ONCE
Status: COMPLETED | OUTPATIENT
Start: 2020-06-05 | End: 2020-06-05

## 2020-06-05 RX ORDER — OXYCODONE HYDROCHLORIDE 5 MG/1
5 TABLET ORAL EVERY 6 HOURS PRN
Qty: 6 TAB | Refills: 0 | Status: SHIPPED | OUTPATIENT
Start: 2020-06-05 | End: 2020-06-08

## 2020-06-05 RX ADMIN — KETOROLAC TROMETHAMINE 15 MG: 30 INJECTION, SOLUTION INTRAMUSCULAR at 17:04

## 2020-06-05 RX ADMIN — OXYCODONE HYDROCHLORIDE 5 MG: 5 TABLET ORAL at 18:27

## 2020-06-05 RX ADMIN — ACETAMINOPHEN 1000 MG: 500 TABLET ORAL at 17:03

## 2020-06-05 RX ADMIN — IOHEXOL 100 ML: 350 INJECTION, SOLUTION INTRAVENOUS at 17:42

## 2020-06-05 NOTE — ED TRIAGE NOTES
"Chief Complaint   Patient presents with   • Abdominal Pain     Left sided   pt states onset \"couple of days\" he denies NVD or difficulty urinating. Pt wearing mask.   "

## 2020-06-06 NOTE — ED NOTES
Discharge education provided. Discharge paperwork signed and given to pt. Prescription x1 given to pt. All questions answered. All belongings with pt. Pt ambulated to lobby unassisted.

## 2020-06-06 NOTE — ED PROVIDER NOTES
ED Provider Note      Means of Arrival: Private vehicle  History obtained from: Patient      CHIEF COMPLAINT  Chief Complaint   Patient presents with   • Abdominal Pain     Left sided       HPI  Jevon Mcclendon is a 34 y.o. male who presents with left-sided abdominal pain.  The patient reports that this onset suddenly while at rest 3 days ago.  He describes it as his left lower abdomen.  He describes it as sharp.  The pain is fluctuating in nature, and when the pain is at its worst, he is unable to get comfortable.  He reports his pain is currently 9/10 in severity.  He denies any hematuria, dysuria, flank pain, nausea, vomiting, diarrhea, fevers, similar prior pain.  He reports he has had kidney stones and states this feels different than that.    REVIEW OF SYSTEMS  CONSTITUTIONAL:  No fever.  CARDIOVASCULAR:  No chest discomfort.  RESPIRATORY:  No pleuritic chest pain.  GASTROINTESTINAL:  see HPI  GENITOURINARY:   No dysuria.  MUSCULOSKELETAL:  No arthralgia.  SKIN:  No rash or suspicious lesions.  NEUROLOGIC:   No headache.  ENDOCRINE:  No facial edema.  HEMATOLOGIC:  No abnormal bleeding.       See HPI for further details.   All other systems are negative.     PAST MEDICAL HISTORY  Past Medical History:   Diagnosis Date   • Dental disorder     condition issues   • Heart burn     occasional   • Migraines    • Psychiatric problem    • Schizophrenia ([facility]) 2017   • Snoring        FAMILY HISTORY  Family History   Problem Relation Age of Onset   • Other Mother         Migraines   • Hypertension Father    • Heart Disease Father         Afib   • Stroke Father    • Cancer Paternal Aunt         Skin Cancer   • Diabetes Paternal Aunt    • No Known Problems Sister    • Schizophrenia Brother    • ADD / ADHD Brother    • No Known Problems Sister        SOCIAL HISTORY   reports that he quit smoking about 3 years ago. His smoking use included cigarettes. He has a 20.00 pack-year smoking history. His smokeless tobacco use  "includes snuff and chew. He reports that he does not drink alcohol or use drugs.    SURGICAL HISTORY  Past Surgical History:   Procedure Laterality Date   • NASAL ENDOSCOPY Bilateral 7/19/2017    Procedure: NASAL ENDOSCOPY & EXCISION NASAL PAPILLOMAS;  Surgeon: Lobito Friedman M.D.;  Location: SURGERY SAME DAY [facility];  Service:    • MASS EXCISION GENERAL Left 3/15/2017    Procedure: MASS EXCISION GENERAL FOR PAPILLOMA;  Surgeon: Lobito Friedman M.D.;  Location: SURGERY SAME DAY [facility];  Service:    • EXAM UNDER ANESTHESIA Right 3/15/2017    Procedure: EXAM UNDER ANESTHESIA SIDE OF NOSE;  Surgeon: Lobito Friedman M.D.;  Location: SURGERY SAME DAY [facility];  Service:    • NASAL POLYPECTOMY Right 3/15/2017    Procedure: NASAL POLYPECTOMY;  Surgeon: Lobito Friedman M.D.;  Location: SURGERY SAME DAY [facility];  Service:    • SEPTOPLASTY  4/16/2014    Performed by Lobito Friedman M.D. at SURGERY SAME DAY [facility]   • TURBINATE REDUCTION  4/16/2014    Performed by Lobito Friedman M.D. at SURGERY SAME DAY [facility]   • OTHER      dental surgery       CURRENT MEDICATIONS  Home Medications     Reviewed by Lynn Salgado R.N. (Registered Nurse) on 06/05/20 at 1622  Med List Status: Partial   Medication Last Dose Status   hydrocodone-acetaminophen (NORCO) 5-325 MG Tab per tablet  Active   loratadine (CLARITIN) 10 MG Tab  Active   ondansetron (ZOFRAN ODT) 8 MG TABLET DISPERSIBLE  Active                ALLERGIES  Allergies   Allergen Reactions   • Clindamycin Shortness of Breath and Rash       PHYSICAL EXAM  VITAL SIGNS: /91   Pulse (!) 109   Temp 35.9 °C (96.7 °F) (Oral)   Resp 16   Ht 1.727 m (5' 8\")   Wt 89.2 kg (196 lb 10.4 oz)   SpO2 94%   BMI 29.90 kg/m²    Gen: Alert  HENT: ATNC  Eyes: Normal conjunctiva  Neck: trachea midline  Resp: no respiratory distress  CV: No JVD, regular rate and rhythm  Abd: non-distended, tenderness to palpation left lower quadrant, left anterior " groin.  No obvious hernia noted.  No rebound or guarding.  Referred left-sided pain on palpation of right lower quadrant.  Otherwise nontender.  : No CVA tenderness  Ext: No deformities  Psych: normal mood  Neuro: speech fluent       RADIOLOGY/PROCEDURES  CT-ABDOMEN-PELVIS WITH   Final Result      1.  Unremarkable contrast enhanced CT scan of the abdomen and pelvis.          LABS  Labs Reviewed   CBC WITH DIFFERENTIAL - Abnormal; Notable for the following components:       Result Value    Eosinophils 7.20 (*)     Eos (Absolute) 0.72 (*)     All other components within normal limits   COMP METABOLIC PANEL - Abnormal; Notable for the following components:    Glucose 122 (*)     All other components within normal limits   LIPASE   URINALYSIS,CULTURE IF INDICATED   ESTIMATED GFR          COURSE & MEDICAL DECISION MAKING  Pertinent Labs & Imaging studies reviewed. (See chart for details)    Patient presents with several days of fluctuating left-sided abdominal pain with mild tenderness.  Will send labs to evaluate for evidence of infection, kidney stone, less likely urinary tract infection.    Labs reassuring, no leukocytosis to suggest infection.  CAT scan negative for diverticulitis, bowel obstruction, hernia, kidney stone.  Urinalysis negative for evidence of pyelonephritis, urinary tract infection, passed kidney stone.  Patient has no evidence of urethritis to suggest STI.  No inguinal lymphadenopathy to suggest atypical STI.  Unclear etiology for the patient's dental pain, however he appears to be stable for discharge home.  He was given close return precautions and anticipatory guidance.  He is advised to follow-up with his regular doctor.    In prescribing controlled substances to this patient, I certify that I have obtained and reviewed the medical history of Jevon Mcclendon. I have also made a good chiquita effort to obtain applicable records from other providers who have treated the patient and records did  not demonstrate any increased risk of substance abuse that would prevent me from prescribing controlled substances.     I have conducted a physical exam and documented it. I have reviewed Mr. Mcclendon’s prescription history as maintained by the [place] Prescription Monitoring Program.     I have assessed the patient’s risk for abuse, dependency, and addiction using the validated Opioid Risk Tool available at https://www.mdcalc.com/lpaccv-yrxm-eqvv-ort-narcotic-abuse.     Given the above, I believe the benefits of controlled substance therapy outweigh the risks. The reasons for prescribing controlled substances include non-narcotic, oral analgesic alternatives have been inadequate for pain control. Accordingly, I have discussed the risk and benefits, treatment plan, and alternative therapies with the patient.   The risks of the medication, including constipation, addiction and altered mental status were discussed with the patient and they expressed understanding. They were encouraged to use the minimum dose necessary to control their breakthrough pain.      Appropriate PPE were worn at this encounter.     FINAL IMPRESSION  1. Left lower quadrant abdominal pain             DISPOSITION:  Patient will be discharged home in stable condition.    FOLLOW UP:  Your regular doctor.  To establish a primary care provider within our system, please call [phone]            OUTPATIENT MEDICATIONS:  New Prescriptions    OXYCODONE IMMEDIATE-RELEASE (ROXICODONE) 5 MG TAB    Take 1 Tab by mouth every 6 hours as needed for Severe Pain for up to 3 days.

## 2020-06-06 NOTE — DISCHARGE INSTRUCTIONS
You were seen in the emergency department for abdominal pain. Your labs and physical exam were reassuring. Your imaging was also reassuring.  At this time, your pain does not appear to be dangerous.     You may try over-the-counter preparations such as Maalox or Tums to see if this improves your pain.    For pain you can take ibuprofen (Motrin), 600mg every 6 hours as needed for pain (take with food to avoid GI upset). You can also take acetaminophen (Tylenol), 1000mg every 8 hours as needed for pain. Do not take more than 3000mg of acetaminophen in any 24 hour period.  Taking these medications regularly during the day can be very effective in controlling pain.    You are being sent home with an opioid pain medication. This medication causes sedation and you should not drive or operate machinery while taking it. You should not use alcohol or recreational drugs while taking this medication. Side effects of this medication can include itching, nausea, and constipation.    There is a risk of addiction to this medication and you should only take the smallest amount necessary to control your symptoms. You should use other non-opioid pain medications for your baseline pain and only use this medication if necessary.     There are many alternatives to pain medications, such as massage, acupuncture, and meditation. Check with your health insurance regarding their coverage of these complementary treatments.      Please follow up with your regular doctor.    Please seek medical care if your symptoms do not improve in 24 hours, if you develop worsening pain, ongoing vomiting, tarry or bloody stools, or for any other new or concerning findings.    ================================  Coronavirus Information    Your visit did NOT relate to coronavirus, but if you or your family develop symptoms that concern you for coronavirus (please see CDC website for symptoms), please contact the [facility] hotline (or your  [facility] health department)  or your healthcare provider before going to a medical facility:    [facility]  24hr hotline: 875.643.4839    Information is available from the Centers for Disease Control and Prevention  www.CDC.gov    and     [facility]  [email or web address]    If you are severely ill or having a hard time breathing, please immediatly seek medical care. Notify the  or Emergency Department Triage about your symptoms.

## 2020-10-06 ENCOUNTER — NON-PROVIDER VISIT (OUTPATIENT)
Dept: OCCUPATIONAL MEDICINE | Facility: CLINIC | Age: 35
End: 2020-10-06

## 2020-10-06 DIAGNOSIS — Z02.1 PRE-EMPLOYMENT DRUG SCREENING: ICD-10-CM

## 2020-10-06 LAB
AMP AMPHETAMINE: NORMAL
COC COCAINE: NORMAL
INT CON NEG: NORMAL
INT CON POS: NORMAL
MET METHAMPHETAMINES: NORMAL
OPI OPIATES: NORMAL
PCP PHENCYCLIDINE: NORMAL
POC DRUG COMMENT 753798-OCCUPATIONAL HEALTH: NORMAL
THC: NORMAL

## 2020-10-06 PROCEDURE — 80305 DRUG TEST PRSMV DIR OPT OBS: CPT | Performed by: NURSE PRACTITIONER

## 2020-11-30 ENCOUNTER — NON-PROVIDER VISIT (OUTPATIENT)
Dept: OCCUPATIONAL MEDICINE | Facility: CLINIC | Age: 35
End: 2020-11-30

## 2020-11-30 DIAGNOSIS — Z02.1 PRE-EMPLOYMENT DRUG SCREENING: ICD-10-CM

## 2020-11-30 PROCEDURE — 80305 DRUG TEST PRSMV DIR OPT OBS: CPT | Performed by: PREVENTIVE MEDICINE

## 2022-01-15 ENCOUNTER — APPOINTMENT (OUTPATIENT)
Dept: RADIOLOGY | Facility: IMAGING CENTER | Age: 37
End: 2022-01-15
Attending: NURSE PRACTITIONER
Payer: COMMERCIAL

## 2022-01-15 ENCOUNTER — OCCUPATIONAL MEDICINE (OUTPATIENT)
Dept: URGENT CARE | Facility: CLINIC | Age: 37
End: 2022-01-15
Payer: COMMERCIAL

## 2022-01-15 VITALS
SYSTOLIC BLOOD PRESSURE: 122 MMHG | OXYGEN SATURATION: 95 % | DIASTOLIC BLOOD PRESSURE: 78 MMHG | TEMPERATURE: 98 F | RESPIRATION RATE: 16 BRPM | HEART RATE: 85 BPM

## 2022-01-15 DIAGNOSIS — S93.402A SPRAIN OF LEFT ANKLE, UNSPECIFIED LIGAMENT, INITIAL ENCOUNTER: ICD-10-CM

## 2022-01-15 DIAGNOSIS — S99.912A INJURY OF LEFT ANKLE, INITIAL ENCOUNTER: ICD-10-CM

## 2022-01-15 PROCEDURE — 99203 OFFICE O/P NEW LOW 30 MIN: CPT | Performed by: NURSE PRACTITIONER

## 2022-01-15 PROCEDURE — 73610 X-RAY EXAM OF ANKLE: CPT | Mod: TC,LT | Performed by: NURSE PRACTITIONER

## 2022-01-15 ASSESSMENT — ENCOUNTER SYMPTOMS
NAUSEA: 0
SHORTNESS OF BREATH: 0
CHILLS: 0
SORE THROAT: 0
FEVER: 0
EYE PAIN: 0
VOMITING: 0
DIZZINESS: 0
MYALGIAS: 0

## 2022-01-15 NOTE — PROGRESS NOTES
Subjective:   Jevon Mcclendon  is a 36 y.o. 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  He has had persistent pain since with ambulation and bearing weight which prompted his follow-up today.  He denies previous injury and/or surgery to the left ankle.  He has tried taking Tylenol and ibuprofen with minimal relief in symptoms.  Patient denies second job.   HPI  Review of Systems   Constitutional: Negative for chills and fever.   HENT: Negative for sore throat.    Eyes: Negative for pain.   Respiratory: Negative for shortness of breath.    Cardiovascular: Negative for chest pain.   Gastrointestinal: Negative for nausea and vomiting.   Genitourinary: Negative for hematuria.   Musculoskeletal: Positive for joint pain. Negative for myalgias.   Skin: Negative for rash.   Neurological: Negative for dizziness.     Allergies   Allergen Reactions   • Clindamycin Shortness of Breath and Rash      Objective:   /78   Pulse 85   Temp 36.7 °C (98 °F)   Resp 16   SpO2 95%   Physical Exam  Constitutional:       Appearance: Normal appearance. He is not ill-appearing or toxic-appearing.   HENT:      Head: Normocephalic.      Right Ear: External ear normal.      Left Ear: External ear normal.      Nose: Nose normal.      Mouth/Throat:      Lips: Pink.      Mouth: Mucous membranes are moist.   Eyes:      General: Lids are normal.         Right eye: No discharge.         Left eye: No discharge.   Pulmonary:      Effort: Pulmonary effort is normal. No accessory muscle usage or respiratory distress.   Musculoskeletal:      Cervical back: Full passive range of motion without pain.      Left ankle: Swelling present. No ecchymosis. Tenderness  present over the lateral malleolus. No base of 5th metatarsal tenderness. Decreased range of motion.   Skin:     Coloration: Skin is not pale.   Neurological:      Mental Status: He is alert and oriented to person, place, and time.   Psychiatric:         Mood and Affect: Mood normal.         Thought Content: Thought content normal.       Left ankle: Skin without erythema, no edema, no ecchymosis.  No gross deformities.  Tenderness to palpation to the lateral malleolus.  +2 pedal pulses, sensation intact distally, neurovascular intact.  Gait antalgic. +AROM with pain.   Assessment/Plan:     1. Injury of left ankle, initial encounter  DX-ANKLE 3+ VIEWS LEFT   2. Sprain of left ankle, unspecified ligament, initial encounter     •   • I independently reviewed the patient's imaging and agree with the interpretation of the radiologist.    X-rays negative for fracture or dislocation.  Patient placed in an Aircast splint provided crutches recommended weightbearing as tolerated.  Patient will be placed on temporary work restrictions.  Continue with Tylenol, ibuprofen, ice, elevate.  Follow-up 1 week.  Differential diagnosis, natural history, supportive care, and indications for immediate follow-up discussed.

## 2022-01-15 NOTE — LETTER
[facility] [address] [address]  Phone:  [phone] - Fax:  [phone]   [facility] Progress Report and Disability Certification  Date of Service: 1/15/2022   No Show:  No  Date / Time of Next Visit: 1/22/2022 at 10AM   Claim Information   Patient Name: Jevon Mcclendon  Claim Number:     Employer:   [employer] Date of Injury: 1/12/2022     Insurer / TPA: Employers Insurance  ID / SSN:     Occupation:   Diagnosis: The encounter diagnosis was Injury of left ankle, initial encounter.    Medical Information   Related to Industrial Injury? 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  He has had persistent pain since with ambulation and bearing weight which prompted his follow-up today.  He denies previous injury and/or surgery to the left ankle.  He has tried taking Tylenol and ibuprofen with minimal relief in symptoms.  Patient denies second job.   Objective Findings: Left ankle: Skin without erythema, no edema, no ecchymosis.  No gross deformities.  Tenderness to palpation to the lateral malleolus.  +2 pedal pulses, sensation intact distally, neurovascular intact.  Gait antalgic. +AROM with pain.   Pre-Existing Condition(s):     Assessment:   Initial Visit    Status: Additional Care Required  Permanent Disability:No    Plan:   Comments:X-rays negative for fracture or dislocation.  Patient placed in an Aircast splint provided crutches recommended weightbearing as tolerated.  Patient will be placed on temporary work restrictions.  Continue with Tylenol, ibuprofen, ice, elevate.  Follow-up     Diagnostics:      Comments:  I independently reviewed the patient's  imaging and agree with the interpretation of the radiologist.    No evidence of fracture or dislocation.    Disability Information   Status: Released to Restricted Duty    From:  1/15/2022  Through: 2022 Restrictions are: Temporary   Physical Restrictions   Sitting:    Standing:  < or = to 1 hr/day Stoopin hrs/day Bending:      Squattin hrs/day Walking:  < or = to 1 hr/day Climbin hrs/day Pushing:      Pulling:    Other:    Reaching Above Shoulder (L):   Reaching Above Shoulder (R):       Reaching Below Shoulder (L):    Reaching Below Shoulder (R):      Not to exceed Weight Limits   Carrying(hrs):   Weight Limit(lb): < or = to 10 pounds Lifting(hrs):   Weight  Limit(lb): < or = to 10 pounds   Comments:      Repetitive Actions   Hands: i.e. Fine Manipulations from Grasping:     Feet: i.e. Operating Foot Controls:     Driving / Operate Machinery:     Health Care Provider’s Original or Electronic Signature  DANIELE HolguinRMannieNMannie Health Care Provider’s Original or Electronic Signature    Tian Delcid MD         Clinic Name / Location: [address] [place], [place] [address], [place] [address] Clinic Phone Number: Dept: [phone]   Appointment Time: 12:45 Pm Visit Start Time: 1:06 PM   Check-In Time:  12:53 Pm Visit Discharge Time:  1:34PM   Original-Treating Physician or Chiropractor    Page 2-Insurer/TPA    Page 3-Employer    Page 4-Employee

## 2022-01-15 NOTE — LETTER
EMPLOYEE’S CLAIM FOR COMPENSATION/ REPORT OF INITIAL TREATMENT  FORM C-4    EMPLOYEE’S CLAIM - PROVIDE ALL INFORMATION REQUESTED   First Name  Jevon Last Name  Hakan Birthdate                    1985                Sex  male Claim Number (Insurer’s Use Only)    Home Address  [address] Age  36 y.o. Height   Weight   SSN     [place] Zip  [address] Telephone  [phone] (home)    Mailing Address  [address] [place] Zip  [address] Primary Language Spoken  English    Insurer   Third-Party   Employers [employer]   Employee's Occupation (Job Title) When Injury or Occupational Disease Occurred      Employer's Name/Company Name    [employer] Telephone  [phone]    Office Mail Address (Number and Street)   [address] [place] [address]  [id number]    Date of Injury  1/12/2022               Hours Injury  9:30 PM Date Employer Notified  1/12/2022 Last Day of Work after Injury     or Occupational Disease  1/12/2022 Supervisor to Whom Injury     Reported  [facility]   Address or Location of Accident (if applicable)  [[address] [place] [address] Way]   What were you doing at the time of accident? (if applicable)  Walking downstairs to my car    How did this injury or occupational disease occur? (Be specific an answer in detail. 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  N/A Witnesses to the Accident  None      Nature of Injury or Occupational Disease  Sprain  Part(s) of Body Injured or Affected  Ankle (L), ,     I certify that the above is true and correct to the best of my knowledge and that I have provided this information in  order to obtain the benefits of [place]  I hereby authorize any physician, chiropractor, surgeon, practitioner, or other person, any hospital, including [facility] hospital, any medical service organization, any insurance company, or other institution or organization to release to each other, any medical or other information, including benefits paid or payable, pertinent to this injury or disease, except information relative to diagnosis, treatment and/or counseling for AIDS, psychological conditions, alcohol or controlled substances, for which I must give specific authorization.  A Photostat of this authorization shall be as valid as the original.     Date   Place Employee’s Original or  *Electronic Signature   THIS REPORT MUST BE COMPLETED AND MAILED WITHIN 3 WORKING DAYS OF TREATMENT   Place  [facility]  Name of [facility]   Date  1/15/2022 Diagnosis and Description of Injury or Occupational Disease  (S99.912A) Injury of left ankle, initial encounter Is there evidence the injured employee was under the influence of alcohol and/or another controlled substance at the time of accident?  ? No ? Yes (if yes, please explain)    Hour  1:06 PM   The encounter diagnosis was Injury of left ankle, initial encounter. No   Treatment  X-rays negative for fracture or dislocation.  Patient placed in an Aircast splint provided crutches recommended weightbearing as tolerated.  Patient will be placed on temporary work restrictions.  Continue with Tylenol, ibuprofen, ice, elevate.  Follow-up   Have you advised the patient to remain off work five days or     more?    X-Ray Findings  Negative   ? Yes Indicate dates:   From   To      From information given by the employee, together with medical evidence, can        you directly connect this injury or occupational disease as job  "incurred?  Yes ? No If no, is the injured employee capable of:  ? full duty  No ? modified duty  Yes   Is additional medical care by a physician indicated?  Yes If Modified Duty, Specify any Limitations / Restrictions  Limited walking, standing, climbing, squatting, stooping, weight restrictions less than 10 pounds   Do you know of any previous injury or disease contributing to this condition or occupational disease?  ? Yes ? No (Explain if yes)                          No   Date  1/15/2022 Print Health Care Provider's   ALVARADO Holguin I certify the employer’s copy of  this form was mailed on:   Address  [address] Insurer’s Use Only     [place]  [id number]    Provider’s Tax ID Number  [id number] Telephone  Dept: [phone]             Health Care Provider’s Original or Electronic Signature  e-BALBINA Andrew Degree (MD,DO, DC,PARatnaC,APRN)   APRN      * Complete and attach Release of Information (Form C-4A) when injured employee signs C-4 Form electronically  ORIGINAL - TREATING HEALTHCARE PROVIDER PAGE 2 - INSURER/TPA PAGE 3 - EMPLOYER PAGE 4 - EMPLOYEE             Form C-4 (rev.08/21)           BRIEF DESCRIPTION OF RIGHTS AND BENEFITS  (Pursuant to [place] 616C.[place])    Notice of Injury or Occupational Disease (Incident Report Form C-1): If an injury or occupational disease (OD) arises out of and in the course of employment, you must provide written notice to your employer as soon as practicable, but no later than 7 days after the accident or OD. Your employer shall maintain a sufficient supply of the required forms.    Claim for Compensation (Form C-4): If medical treatment is sought, the form C-4 is available at the place of initial treatment. A completed \"Claim for Compensation\" (Form C-4) must be filed within 90 days after an accident or OD. The treating physician or chiropractor must, within 3 working days after treatment, complete and mail to the employer, the " employer's insurer and third-party , the Claim for Compensation.    Medical Treatment: If you require medical treatment for your on-the-job injury or OD, you may be required to select a physician or chiropractor from a list provided by your workers’ compensation insurer, if it has contracted with an Organization for Managed Care (MCO) or Preferred Provider Organization (PPO) or providers of health care. If your employer has not entered into a contract with an MCO or PPO, you may select a physician or chiropractor from the Panel of Physicians and Chiropractors. Any medical costs related to your industrial injury or OD will be paid by your insurer.    Temporary Total Disability (TTD): If your doctor has certified that you are unable to work for a period of at least 5 consecutive days, or 5 cumulative days in a 20-day period, or places restrictions on you that your employer does not accommodate, you may be entitled to TTD compensation.    Temporary Partial Disability (TPD): If the wage you receive upon reemployment is less than the compensation for TTD to which you are entitled, the insurer may be required to pay you TPD compensation to make up the difference. TPD can only be paid for a maximum of 24 months.    Permanent Partial Disability (PPD): When your medical condition is stable and there is an indication of a PPD as a result of your injury or OD, within 30 days, your insurer must arrange for an evaluation by a rating physician or chiropractor to determine the degree of your PPD. The amount of your PPD award depends on the date of injury, the results of the PPD evaluation, your age and wage.    Permanent Total Disability (PTD): If you are medically certified by a treating physician or chiropractor as permanently and totally disabled and have been granted a PTD status by your insurer, you are entitled to receive monthly benefits not to exceed 66 2/3% of your average monthly wage. The amount of your PTD  payments is subject to reduction if you previously received a lump-sum PPD award.    Vocational Rehabilitation Services: You may be eligible for vocational rehabilitation services if you are unable to return to the job due to a permanent physical impairment or permanent restrictions as a result of your injury or occupational disease.    Transportation and Per [place]iana Reimbursement: You may be eligible for travel expenses and per iliana associated with medical treatment.    Reopening: You may be able to reopen your claim if your condition worsens after claim closure.     Appeal Process: If you disagree with a written determination issued by the insurer or the insurer does not respond to your request, you may appeal to the Department of Administration, , by following the instructions contained in your determination letter. You must appeal the determination within 70 days from the date of the determination letter at [address], [address], [place], [place] [address], or [address], [address], [place], [place] [address]. If you disagree with the  decision, you may appeal to the Department of Administration, . You must file your appeal within 30 days from the date of the  decision letter at [address], [address], [place], [place] [address], or [address], [address], [place], [place] [address]. If you disagree with a decision of an , you may file a petition for judicial review with the District Court. You must do so within 30 days of the Appeal Officer’s decision. You may be represented by an  at your own expense or you may contact the [facility] for possible representation.    [place]  for Injured Workers (NAIW): If you disagree with a  decision, you may request that NAIW represent you without charge at an  Hearing. For information regarding denial of benefits, you may contact  the NAIW at: [address], [address], [place] [address], [phone], or [address] [facility] [facility], [address], [place] [address], [phone]    To File a Complaint with the Division: If you wish to file a complaint with the  of the Division of Industrial Relations (DIR),  please contact the Workers’ Compensation Section, [address], [address], [place], [place] [address], telephone [phone]) [phone], or [address], [address], [place], [place] [address], telephone [phone].    For assistance with Workers’ Compensation Issues: You may contact the [facility] Office for Consumer Health Assistance, [address], [place] [address], Toll Free 1-912.728.1740, Web site: [email or web address].[facility]/Programs/AMY E-mail: [email or web address].[facility]              __________________________________________________________________                                    _________________            Employee Name / Signature                                                                                                                            Date                                                                                                                                                                                                                              D-2 (rev. 10/20)

## 2022-01-25 ENCOUNTER — OCCUPATIONAL MEDICINE (OUTPATIENT)
Dept: URGENT CARE | Facility: CLINIC | Age: 37
End: 2022-01-25
Payer: COMMERCIAL

## 2022-01-25 VITALS
HEART RATE: 76 BPM | SYSTOLIC BLOOD PRESSURE: 120 MMHG | RESPIRATION RATE: 14 BRPM | WEIGHT: 183 LBS | OXYGEN SATURATION: 97 % | BODY MASS INDEX: 27.74 KG/M2 | TEMPERATURE: 98.4 F | DIASTOLIC BLOOD PRESSURE: 68 MMHG | HEIGHT: 68 IN

## 2022-01-25 DIAGNOSIS — S93.402D SPRAIN OF LEFT ANKLE, UNSPECIFIED LIGAMENT, SUBSEQUENT ENCOUNTER: ICD-10-CM

## 2022-01-25 DIAGNOSIS — S99.912D INJURY OF LEFT ANKLE, SUBSEQUENT ENCOUNTER: ICD-10-CM

## 2022-01-25 PROCEDURE — 99212 OFFICE O/P EST SF 10 MIN: CPT | Performed by: PHYSICIAN ASSISTANT

## 2022-01-25 ASSESSMENT — FIBROSIS 4 INDEX: FIB4 SCORE: 0.45

## 2022-01-25 NOTE — PROGRESS NOTES
"Subjective:     Jevon Mcclendon is a 36 y.o. 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  He has had persistent pain since with ambulation and bearing weight which prompted his follow-up today.  He denies previous injury and/or surgery to the left ankle.  He has tried taking Tylenol and ibuprofen with minimal relief in symptoms.  Patient denies second job.\"    Follow up today 1/24/2022 patient reports improvement of his left ankle pain.  He reports he only has pain to his left ankle with certain movements.  He has not needed the Aircast anymore.  He is walking normally.  No numbness or tingling.  He is ready to go back to work full duty.    PMH:   No pertinent past medical history to this problem  MEDS:  Medications were reviewed in EMR  ALLERGIES:  Allergies were reviewed in EMR  SOCHX:  Works as a    FH:   No pertinent family history to this problem       Objective:     /68   Pulse 76   Temp 36.9 °C (98.4 °F)   Resp 14   Ht 1.727 m (5' 8\")   Wt 83 kg (183 lb)   SpO2 97%   BMI 27.83 kg/m²     Constitutional: Well-appearing no acute distress  Left ankle: Negative TTP, swelling, ecchymosis, crepitus or deformity. No other point bony tenderness of ankle, foot, or leg. Normal strength to both plantarflexion and dorsiflexion. Distal neuro/vascular intact. Skin intact.  Normal gait      Assessment/Plan:       1. Injury of left ankle, subsequent encounter    2. Sprain of left ankle, unspecified ligament, subsequent encounter    • Released to Full Duty FROM   TO    •    • Plan  • Discharge MMI    Differential diagnosis, natural history, supportive care, and indications for immediate follow-up " discussed.

## 2022-01-25 NOTE — LETTER
"   [facility]  [address] [facility] [address] [address]  Phone:  [phone] - Fax:  [phone]   [facility] Progress Report and Disability Certification  Date of Service: 1/25/2022   No Show:  No  Date / Time of Next Visit:     Claim Information   Patient Name: Jevon Mcclendon  Claim Number:     Employer:   [employer] Date of Injury: 1/12/2022     Insurer / TPA: Employers Insurance  ID / SSN:     Occupation:   Diagnosis: Diagnoses of Injury of left ankle, subsequent encounter and Sprain of left ankle, unspecified ligament, subsequent encounter were pertinent to this visit.    Medical Information   Related to Industrial Injury? 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  He has had persistent pain since with ambulation and bearing weight which prompted his follow-up today.  He denies previous injury and/or surgery to the left ankle.  He has tried taking Tylenol and ibuprofen with minimal relief in symptoms.  Patient denies second job.\"    Follow up today 1/24/2022 patient reports improvement of his left ankle pain.  He reports he only has pain to his left ankle with certain movements.  He has not needed the Aircast anymore.  He is walking normally.  No numbness or tingling.  He is ready to go back to work full duty.   Objective Findings: Constitutional: Well-appearing no acute distress  Left ankle: Negative TTP, swelling, ecchymosis, crepitus or deformity. No other point bony tenderness of ankle, foot, or leg. Normal strength to both plantarflexion and dorsiflexion. Distal neuro/vascular intact. Skin intact.  Normal gait     Pre-Existing Condition(s):     Assessment:   Condition " Improved    Status: Discharged /  MMI  Permanent Disability:No    Plan:      Diagnostics:      Comments:  Plan  Discharge MMI    Disability Information   Status: Released to Full Duty    From:     Through:   Restrictions are:     Physical Restrictions   Sitting:    Standing:    Stooping:    Bending:      Squatting:    Walking:    Climbing:    Pushing:      Pulling:    Other:    Reaching Above Shoulder (L):   Reaching Above Shoulder (R):       Reaching Below Shoulder (L):    Reaching Below Shoulder (R):      Not to exceed Weight Limits   Carrying(hrs):   Weight Limit(lb):   Lifting(hrs):   Weight  Limit(lb):     Comments:      Repetitive Actions   Hands: i.e. Fine Manipulations from Grasping:     Feet: i.e. Operating Foot Controls:     Driving / Operate Machinery:     Health Care Provider’s Original or Electronic Signature  Feroz Clemente P.A.-C. Health Care Provider’s Original or Electronic Signature    Tian Delcid MD         Clinic Name / Location: [address] [address] Clinic Phone Number: Dept: [phone]   Appointment Time: 11:45 Am Visit Start Time: 11:53 AM   Check-In Time:  11:46 Am Visit Discharge Time:  1215 pm    Original-Treating Physician or Chiropractor    Page 2-Insurer/TPA    Page 3-Employer    Page 4-Employee

## 2022-02-28 ENCOUNTER — OFFICE VISIT (OUTPATIENT)
Dept: URGENT CARE | Facility: CLINIC | Age: 37
End: 2022-02-28
Payer: COMMERCIAL

## 2022-02-28 VITALS
WEIGHT: 182 LBS | HEIGHT: 68 IN | DIASTOLIC BLOOD PRESSURE: 68 MMHG | OXYGEN SATURATION: 97 % | SYSTOLIC BLOOD PRESSURE: 102 MMHG | RESPIRATION RATE: 18 BRPM | BODY MASS INDEX: 27.58 KG/M2 | TEMPERATURE: 97.3 F | HEART RATE: 70 BPM

## 2022-02-28 DIAGNOSIS — Z51.89 WOUND CHECK, ABSCESS: ICD-10-CM

## 2022-02-28 PROCEDURE — 99213 OFFICE O/P EST LOW 20 MIN: CPT | Performed by: NURSE PRACTITIONER

## 2022-02-28 RX ORDER — CEPHALEXIN 500 MG/1
500 CAPSULE ORAL
COMMUNITY
Start: 2022-02-25 | End: 2022-03-04

## 2022-02-28 RX ORDER — SULFAMETHOXAZOLE AND TRIMETHOPRIM 800; 160 MG/1; MG/1
1 TABLET ORAL
COMMUNITY
Start: 2022-02-25 | End: 2022-03-02

## 2022-02-28 ASSESSMENT — ENCOUNTER SYMPTOMS
NAUSEA: 0
SORE THROAT: 0
MYALGIAS: 0
SHORTNESS OF BREATH: 0
VOMITING: 0
EYE PAIN: 0
CHILLS: 0
FEVER: 0
DIZZINESS: 0

## 2022-02-28 ASSESSMENT — FIBROSIS 4 INDEX: FIB4 SCORE: 0.45

## 2022-02-28 NOTE — PROGRESS NOTES
Subjective:   Jevon Mcclendon is a 36 y.o. male who presents for Follow-Up ((R) arm Packing Removal, pt concerned unable to bend arm/pain. )      Wound Check  He was originally treated 2 to 3 days ago. Previous treatment included I&D of abscess and oral antibiotics. The maximum temperature noted was less than 100.4 F. The temperature was taken using an axillary reading. There has been no drainage from the wound. The redness has improved. The swelling has improved. The pain has improved. There is difficulty moving the extremity or digit due to pain (pain on lateral aspect of forearm with extension of elbow).       Review of Systems   Constitutional: Negative for chills and fever.   HENT: Negative for sore throat.    Eyes: Negative for pain.   Respiratory: Negative for shortness of breath.    Cardiovascular: Negative for chest pain.   Gastrointestinal: Negative for nausea and vomiting.   Genitourinary: Negative for hematuria.   Musculoskeletal: Negative for myalgias.        Right arm pain     Skin: Negative for rash.        Abscess right forearm , bandage and packing in place     Neurological: Negative for dizziness.       Medications:    • cephALEXin Caps  • loratadine Tabs  • sulfamethoxazole-trimethoprim    Allergies: Clindamycin    Problem List: Jevon Mcclendon does not have any pertinent problems on file.    Surgical History:  Past Surgical History:   Procedure Laterality Date   • NASAL ENDOSCOPY Bilateral 7/19/2017    Procedure: NASAL ENDOSCOPY & EXCISION NASAL PAPILLOMAS;  Surgeon: Lobito Friedman M.D.;  Location: SURGERY SAME DAY [facility];  Service:    • MASS EXCISION GENERAL Left 3/15/2017    Procedure: MASS EXCISION GENERAL FOR PAPILLOMA;  Surgeon: Lobito Friedman M.D.;  Location: SURGERY SAME DAY [facility];  Service:    • EXAM UNDER ANESTHESIA Right 3/15/2017    Procedure: EXAM UNDER ANESTHESIA SIDE OF NOSE;  Surgeon: Lobito Friedman M.D.;  Location: SURGERY SAME DAY [facility];   "Service:    • NASAL POLYPECTOMY Right 3/15/2017    Procedure: NASAL POLYPECTOMY;  Surgeon: Lboito Friedman M.D.;  Location: SURGERY SAME DAY [facility];  Service:    • SEPTOPLASTY  4/16/2014    Performed by Lobito Friedman M.D. at SURGERY SAME DAY [facility]   • TURBINATE REDUCTION  4/16/2014    Performed by Lobito Friedman M.D. at SURGERY SAME DAY [facility]   • OTHER      dental surgery       Past Social Hx: Jevon Mcclendon  reports that he quit smoking about 5 years ago. His smoking use included cigarettes. He has a 20.00 pack-year smoking history. His smokeless tobacco use includes snuff and chew. He reports that he does not drink alcohol and does not use drugs.     Past Family Hx:  Jevon Mcclendon family history includes ADD / ADHD in his brother; Cancer in his paternal aunt; Diabetes in his paternal aunt; Heart Disease in his father; Hypertension in his father; No Known Problems in his sister and sister; Other in his mother; Schizophrenia in his brother; Stroke in his father.     Problem list, medications, and allergies reviewed by myself today in Epic.     Objective:     /68   Pulse 70   Temp 36.3 °C (97.3 °F)   Resp 18   Ht 1.727 m (5' 8\")   Wt 82.6 kg (182 lb)   SpO2 97%   BMI 27.67 kg/m²     Physical Exam  Constitutional:       Appearance: Normal appearance. He is not ill-appearing or toxic-appearing.   HENT:      Head: Normocephalic.      Right Ear: External ear normal.      Left Ear: External ear normal.      Nose: Nose normal.      Mouth/Throat:      Lips: Pink.      Mouth: Mucous membranes are moist.   Eyes:      General: Lids are normal.         Right eye: No discharge.         Left eye: No discharge.   Pulmonary:      Effort: Pulmonary effort is normal. No accessory muscle usage or respiratory distress.   Musculoskeletal:         General: Normal range of motion.      Right elbow: Normal.      Right forearm: Swelling and tenderness present.        Arms:       Cervical " back: Full passive range of motion without pain.      Comments: Abscess of the right forearm packing removed no discharge present, surrounding tissue mildly erythematous tenderness noted of the lateral aspect.  Pain elicited with extension of the elbow with decreased range of motion.  No bony tenderness, no erythema of the joint   Skin:     Coloration: Skin is not pale.   Neurological:      Mental Status: He is alert and oriented to person, place, and time.   Psychiatric:         Mood and Affect: Mood normal.         Thought Content: Thought content normal.         Assessment/Plan:     Diagnosis and associated orders:     1. Wound check, abscess        Comments/MDM:     • Patient is a 36-year-old male present with the stated above, packing was removed, wound irrigated with NS followed with 1 cc of lidocaine.  Repacked 1/4in packing.  Bandage applied.  Patient will continue taking Keflex and Bactrim.  Recommended Tylenol ibuprofen as needed for pain.  No signs of septic joint he does have limited range of motion however suspect this to be inflammatory in nature as it is just lateral of open abscess.  Patient will return to clinic in 48 hours for wound check  • Differential diagnosis, natural history, supportive care, and indications for immediate follow-up discussed.                Please note that this dictation was created using voice recognition software. I have made a reasonable attempt to correct obvious errors, but I expect that there are errors of grammar and possibly content that I did not discover before finalizing the note.    This note was electronically signed by Valdemar MONGE.

## 2022-03-02 ENCOUNTER — OFFICE VISIT (OUTPATIENT)
Dept: URGENT CARE | Facility: CLINIC | Age: 37
End: 2022-03-02
Payer: COMMERCIAL

## 2022-03-02 VITALS
BODY MASS INDEX: 27.58 KG/M2 | HEIGHT: 68 IN | HEART RATE: 77 BPM | OXYGEN SATURATION: 95 % | SYSTOLIC BLOOD PRESSURE: 120 MMHG | WEIGHT: 182 LBS | RESPIRATION RATE: 16 BRPM | DIASTOLIC BLOOD PRESSURE: 80 MMHG | TEMPERATURE: 97.1 F

## 2022-03-02 DIAGNOSIS — L02.91 ABSCESS: ICD-10-CM

## 2022-03-02 PROCEDURE — 99213 OFFICE O/P EST LOW 20 MIN: CPT | Performed by: PHYSICIAN ASSISTANT

## 2022-03-02 RX ORDER — CEPHALEXIN 500 MG/1
500 CAPSULE ORAL 4 TIMES DAILY
Qty: 20 CAPSULE | Refills: 0 | Status: SHIPPED | OUTPATIENT
Start: 2022-03-02 | End: 2022-03-07

## 2022-03-02 RX ORDER — SULFAMETHOXAZOLE AND TRIMETHOPRIM 800; 160 MG/1; MG/1
1 TABLET ORAL 2 TIMES DAILY
Qty: 10 TABLET | Refills: 0 | Status: SHIPPED | OUTPATIENT
Start: 2022-03-02 | End: 2022-03-07

## 2022-03-02 ASSESSMENT — ENCOUNTER SYMPTOMS
MYALGIAS: 0
SENSORY CHANGE: 0
WEAKNESS: 0
CHILLS: 0
TINGLING: 0
VOMITING: 0
FEVER: 0
NAUSEA: 0

## 2022-03-02 ASSESSMENT — FIBROSIS 4 INDEX: FIB4 SCORE: 0.45

## 2022-03-02 NOTE — PROGRESS NOTES
Subjective     Will Hay Mcclendon is a 36 y.o. male who presents with Wound Check (REPACK/)            Wound Check  He was originally treated 5 to 10 days ago (6 days ago. Patient seen at [facility] ED. Last check was 2 days ago in ). Previous treatment included I&D of abscess (Right arm abscess ). His temperature was unmeasured prior to arrival. There has been no drainage from the wound. The redness has improved. The swelling has improved. The pain has improved. There is difficulty moving the extremity or digit due to pain.     The patient has 1.5 days left of his antibiotics.    Past Medical History:   Diagnosis Date   • Dental disorder     condition issues   • Heart burn     occasional   • Migraines    • Psychiatric problem    • Schizophrenia (HCC) 2017   • Snoring        Past Surgical History:   Procedure Laterality Date   • NASAL ENDOSCOPY Bilateral 7/19/2017    Procedure: NASAL ENDOSCOPY & EXCISION NASAL PAPILLOMAS;  Surgeon: Lobito Friedman M.D.;  Location: SURGERY SAME DAY [facility];  Service:    • MASS EXCISION GENERAL Left 3/15/2017    Procedure: MASS EXCISION GENERAL FOR PAPILLOMA;  Surgeon: Lobito Friedman M.D.;  Location: SURGERY SAME DAY [facility];  Service:    • EXAM UNDER ANESTHESIA Right 3/15/2017    Procedure: EXAM UNDER ANESTHESIA SIDE OF NOSE;  Surgeon: Lobito Friedman M.D.;  Location: SURGERY SAME DAY [facility];  Service:    • NASAL POLYPECTOMY Right 3/15/2017    Procedure: NASAL POLYPECTOMY;  Surgeon: Lobito Friedman M.D.;  Location: SURGERY SAME DAY [facility];  Service:    • SEPTOPLASTY  4/16/2014    Performed by Lobito Friedman M.D. at SURGERY SAME DAY [facility]   • TURBINATE REDUCTION  4/16/2014    Performed by Lobito Friedman M.D. at SURGERY SAME DAY [facility]   • OTHER      dental surgery       Family History   Problem Relation Age of Onset   • Other Mother         Migraines   • Hypertension Father    • Heart Disease Father         Afib   • Stroke Father    •  "Cancer Paternal Aunt         Skin Cancer   • Diabetes Paternal Aunt    • No Known Problems Sister    • Schizophrenia Brother    • ADD / ADHD Brother    • No Known Problems Sister        Allergies   Allergen Reactions   • Clindamycin Shortness of Breath and Rash       Medications, Allergies, and current problem list reviewed today in Epic    Review of Systems   Constitutional: Negative for chills, fever and malaise/fatigue.   Gastrointestinal: Negative for nausea and vomiting.   Musculoskeletal: Negative for myalgias.   Skin:        Abscess right arm   Neurological: Negative for tingling, sensory change and weakness.     All other systems reviewed and are negative.              Objective     /80   Pulse 77   Temp 36.2 °C (97.1 °F) (Temporal)   Resp 16   Ht 1.727 m (5' 8\")   Wt 82.6 kg (182 lb)   SpO2 95%   BMI 27.67 kg/m²      Physical Exam  Constitutional:       General: He is not in acute distress.     Appearance: He is not ill-appearing.   HENT:      Head: Normocephalic and atraumatic.   Eyes:      Conjunctiva/sclera: Conjunctivae normal.   Cardiovascular:      Rate and Rhythm: Normal rate and regular rhythm.   Pulmonary:      Effort: Pulmonary effort is normal. No respiratory distress.   Musculoskeletal:        Arms:    Neurological:      General: No focal deficit present.      Mental Status: He is alert and oriented to person, place, and time.   Psychiatric:         Mood and Affect: Mood normal.         Behavior: Behavior normal.         Thought Content: Thought content normal.         Judgment: Judgment normal.                             Assessment & Plan        1. Abscess  - sulfamethoxazole-trimethoprim (BACTRIM DS) 800-160 MG tablet; Take 1 Tablet by mouth 2 times a day for 5 days.  Dispense: 10 Tablet; Refill: 0  - cephALEXin (KEFLEX) 500 MG Cap; Take 1 Capsule by mouth 4 times a day for 5 days.  Dispense: 20 Capsule; Refill: 0    Last two visits reviewed.    Extend antibiotic treatment.  RTC " in 2 days  Gauze removed, wound re-packed and dressing applied  Wound care discussed.    Differential diagnoses, Supportive care, and indications for immediate follow-up discussed with patient.   Pathogenesis of diagnosis discussed including typical length and natural progression.   Instructed to return to clinic or nearest emergency department for any change in condition, further concerns, or worsening of symptoms.    The patient demonstrated a good understanding and agreed with the treatment plan.    France Ramirez P.A.-C.

## 2022-03-04 ENCOUNTER — OFFICE VISIT (OUTPATIENT)
Dept: URGENT CARE | Facility: CLINIC | Age: 37
End: 2022-03-04
Payer: COMMERCIAL

## 2022-03-04 VITALS
HEART RATE: 74 BPM | WEIGHT: 182 LBS | HEIGHT: 68 IN | SYSTOLIC BLOOD PRESSURE: 128 MMHG | TEMPERATURE: 96.9 F | BODY MASS INDEX: 27.58 KG/M2 | RESPIRATION RATE: 20 BRPM | OXYGEN SATURATION: 98 % | DIASTOLIC BLOOD PRESSURE: 84 MMHG

## 2022-03-04 DIAGNOSIS — Z51.89 WOUND CHECK, ABSCESS: ICD-10-CM

## 2022-03-04 DIAGNOSIS — L02.91 ABSCESS: ICD-10-CM

## 2022-03-04 PROCEDURE — 99024 POSTOP FOLLOW-UP VISIT: CPT | Performed by: PHYSICIAN ASSISTANT

## 2022-03-04 ASSESSMENT — FIBROSIS 4 INDEX: FIB4 SCORE: 0.45

## 2022-03-04 NOTE — PROGRESS NOTES
HPI:   Presents for wound check 6 days post procedure/ I&D. Wound without redness, no discharge noted. Dressing in place.  Patient reports improved pain    ROS:   no fever, no sensory loss, no weakness    Exam:   Gen: WDWN in no distress  Wound: well healing wound. No evidence of dehiscence or infection.  Neuro: sensory/motor intact.    A/P   Abscess I&D wound check  F/U prn      DRESSING CHANGE:   Dressing and packing removed with trace drainage   Wound irrigated with NS   Dressing applied   Pt tolerated procedure well

## 2022-08-01 ENCOUNTER — TELEPHONE (OUTPATIENT)
Dept: SCHEDULING | Facility: IMAGING CENTER | Age: 37
End: 2022-08-01

## 2022-09-14 ENCOUNTER — APPOINTMENT (OUTPATIENT)
Dept: MEDICAL GROUP | Facility: CLINIC | Age: 37
End: 2022-09-14
Payer: COMMERCIAL

## 2022-11-09 ENCOUNTER — APPOINTMENT (OUTPATIENT)
Dept: MEDICAL GROUP | Facility: CLINIC | Age: 37
End: 2022-11-09
Payer: COMMERCIAL

## 2023-01-30 ENCOUNTER — OFFICE VISIT (OUTPATIENT)
Dept: INTERNAL MEDICINE | Facility: OTHER | Age: 38
End: 2023-01-30
Payer: COMMERCIAL

## 2023-01-30 VITALS
TEMPERATURE: 98.2 F | SYSTOLIC BLOOD PRESSURE: 134 MMHG | HEART RATE: 72 BPM | HEIGHT: 67 IN | WEIGHT: 174 LBS | OXYGEN SATURATION: 95 % | BODY MASS INDEX: 27.31 KG/M2 | DIASTOLIC BLOOD PRESSURE: 80 MMHG

## 2023-01-30 DIAGNOSIS — J30.1 SEASONAL ALLERGIC RHINITIS DUE TO POLLEN: ICD-10-CM

## 2023-01-30 DIAGNOSIS — Z13.228 SCREENING FOR METABOLIC DISORDER: ICD-10-CM

## 2023-01-30 DIAGNOSIS — F32.2 SEVERE DEPRESSION (HCC): ICD-10-CM

## 2023-01-30 DIAGNOSIS — F41.9 SEVERE ANXIETY: ICD-10-CM

## 2023-01-30 DIAGNOSIS — F51.01 PRIMARY INSOMNIA: ICD-10-CM

## 2023-01-30 PROCEDURE — 99204 OFFICE O/P NEW MOD 45 MIN: CPT | Mod: GC | Performed by: STUDENT IN AN ORGANIZED HEALTH CARE EDUCATION/TRAINING PROGRAM

## 2023-01-30 RX ORDER — SERTRALINE HYDROCHLORIDE 25 MG/1
25 TABLET, FILM COATED ORAL DAILY
Qty: 30 TABLET | Refills: 1 | Status: SHIPPED | OUTPATIENT
Start: 2023-01-30 | End: 2024-01-22 | Stop reason: SDUPTHER

## 2023-01-30 RX ORDER — TRAZODONE HYDROCHLORIDE 50 MG/1
50 TABLET ORAL NIGHTLY
Qty: 30 TABLET | Refills: 1 | Status: SHIPPED | OUTPATIENT
Start: 2023-01-30 | End: 2024-01-22 | Stop reason: SDUPTHER

## 2023-01-30 RX ORDER — LORATADINE 10 MG/1
10 TABLET ORAL DAILY
Qty: 90 TABLET | Refills: 1 | Status: SHIPPED | OUTPATIENT
Start: 2023-01-30 | End: 2024-01-22 | Stop reason: SDUPTHER

## 2023-01-30 ASSESSMENT — PATIENT HEALTH QUESTIONNAIRE - PHQ9
CLINICAL INTERPRETATION OF PHQ2 SCORE: 6
5. POOR APPETITE OR OVEREATING: 2 - MORE THAN HALF THE DAYS
SUM OF ALL RESPONSES TO PHQ QUESTIONS 1-9: 20

## 2023-01-30 ASSESSMENT — ENCOUNTER SYMPTOMS
NERVOUS/ANXIOUS: 1
MYALGIAS: 0
SHORTNESS OF BREATH: 0
DEPRESSION: 1
DOUBLE VISION: 0
INSOMNIA: 1
VOMITING: 0
CHILLS: 0
BLURRED VISION: 0
NAUSEA: 0
FEVER: 0
DIARRHEA: 0
CONSTIPATION: 0
ABDOMINAL PAIN: 0
HEADACHES: 0
WEAKNESS: 0
COUGH: 0
PALPITATIONS: 0

## 2023-01-30 ASSESSMENT — ANXIETY QUESTIONNAIRES
6. BECOMING EASILY ANNOYED OR IRRITABLE: MORE THAN HALF THE DAYS
1. FEELING NERVOUS, ANXIOUS, OR ON EDGE: NEARLY EVERY DAY
IF YOU CHECKED OFF ANY PROBLEMS ON THIS QUESTIONNAIRE, HOW DIFFICULT HAVE THESE PROBLEMS MADE IT FOR YOU TO DO YOUR WORK, TAKE CARE OF THINGS AT HOME, OR GET ALONG WITH OTHER PEOPLE: VERY DIFFICULT
4. TROUBLE RELAXING: NEARLY EVERY DAY
2. NOT BEING ABLE TO STOP OR CONTROL WORRYING: NEARLY EVERY DAY
GAD7 TOTAL SCORE: 18
5. BEING SO RESTLESS THAT IT IS HARD TO SIT STILL: MORE THAN HALF THE DAYS
7. FEELING AFRAID AS IF SOMETHING AWFUL MIGHT HAPPEN: MORE THAN HALF THE DAYS
3. WORRYING TOO MUCH ABOUT DIFFERENT THINGS: NEARLY EVERY DAY

## 2023-01-30 NOTE — ASSESSMENT & PLAN NOTE
- First diagnosed around 7-8 years ago  - Not been on medications for years  - Recent stressors include divorce from wife  - PHQ-9 score of 20, meeting criteria for severe depression  - Will start Zoloft (25 mg, daily)  - Will also refer to Psychology  - F/u in 7 weeks

## 2023-01-30 NOTE — PROGRESS NOTES
New Patient to Establish    Reason to establish: New patient to establish    CC: Establish care/follow-up on depression/anxiety/insomnia    HPI:      Depression/anxiety - First diagnosed around 7-8 years ago, has not been on medications for years. He reports he would try to keep his mind occupied with activities that he enjoys - hanging with his wife and kids, playing video games. He recently his wife, which has unfortunately changed his mindset. Today, he scored an 20 on PHQ-9 and 18 on ARTIE-7.        Insomnia - long-standing history of this, reports difficulty falling and staying asleep. He has tried Melatonin (5 mg, daily) without success. He also reports trying Trazodone in the past and had success with that. He has not tried it in years though and is unable to remember the dose.       Seasonal allergies - chronic issue, has failed OTC medications. Has done well on Loratidine for years and tolerated it well - reports that it's the only one that has not caused drowsiness for him.         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    Seasonal allergic rhinitis due to pollen 11/23/2016    Chronic migraine 04/04/2016    H/O vasectomy 04/04/2016    Bilateral low back pain without sciatica 04/04/2016    Allergic rhinitis 05/06/2014    Insomnia 05/06/2014    Deviated septum 04/16/2014       Past Medical History:   Diagnosis Date    Dental disorder     condition issues    Heart burn     occasional    Migraines     Psychiatric problem     Schizophrenia ([facility]) 2017    Snoring        Current Outpatient Medications   Medication Sig Dispense Refill    sertraline (ZOLOFT) 25 MG tablet Take 1 Tablet by mouth every day. 30 Tablet 1    traZODone (DESYREL) 50 MG Tab Take 1  Tablet by mouth every evening. 30 Tablet 1    loratadine (CLARITIN) 10 MG Tab Take 1 Tablet by mouth every day. 90 Tablet 1     No current facility-administered medications for this visit.       Allergies as of 2023 - Reviewed 2023   Allergen Reaction Noted    Clindamycin Shortness of Breath and Rash 2012       Social History     Socioeconomic History    Marital status:      Spouse name: Not on file    Number of children: Not on file    Years of education: Not on file    Highest education level: Not on file   Occupational History    Not on file   Tobacco Use    Smoking status: Former     Packs/day: 1.00     Years: 20.00     Pack years: 20.00     Types: Cigarettes     Quit date: 2017     Years since quittin.9    Smokeless tobacco: Current     Types: Snuff, Chew   Vaping Use    Vaping Use: Never used   Substance and Sexual Activity    Alcohol use: No     Alcohol/week: 1.2 oz     Types: 2 Shots of liquor per week     Comment:                           Vape    Drug use: No     Types: Oral     Comment: marijuanna 1-2 times per month. hx-crack, ecstasy quit     Sexual activity: Yes     Partners: Female   Other Topics Concern    Not on file   Social History Narrative    Not on file     Social Determinants of Health     Financial Resource Strain: Not on file   Food Insecurity: Not on file   Transportation Needs: Not on file   Physical Activity: Not on file   Stress: Not on file   Social Connections: Not on file   Intimate Partner Violence: Not on file   Housing Stability: Not on file       Family History   Problem Relation Age of Onset    Other Mother         Migraines    Hypertension Father     Heart Disease Father         Afib    Stroke Father     Cancer Paternal Aunt         Skin Cancer    Diabetes Paternal Aunt     No Known Problems Sister     Schizophrenia Brother     ADD / ADHD Brother     No Known Problems Sister        Past Surgical History:   Procedure Laterality Date    NASAL  "ENDOSCOPY Bilateral 7/19/2017    Procedure: NASAL ENDOSCOPY & EXCISION NASAL PAPILLOMAS;  Surgeon: Lobito Friedman M.D.;  Location: SURGERY SAME DAY [facility];  Service:     MASS EXCISION GENERAL Left 3/15/2017    Procedure: MASS EXCISION GENERAL FOR PAPILLOMA;  Surgeon: Lobito Friedman M.D.;  Location: SURGERY SAME DAY [facility];  Service:     EXAM UNDER ANESTHESIA Right 3/15/2017    Procedure: EXAM UNDER ANESTHESIA SIDE OF NOSE;  Surgeon: Lobito Friedman M.D.;  Location: SURGERY SAME DAY [facility];  Service:     NASAL POLYPECTOMY Right 3/15/2017    Procedure: NASAL POLYPECTOMY;  Surgeon: Lobito Friedman M.D.;  Location: SURGERY SAME DAY [facility];  Service:     SEPTOPLASTY  4/16/2014    Performed by Lobito Friedman M.D. at SURGERY SAME DAY [facility]    TURBINATE REDUCTION  4/16/2014    Performed by Lobito Friedman M.D. at SURGERY SAME DAY [facility]    OTHER      dental surgery       Review of Systems   Constitutional:  Negative for chills and fever.   HENT:  Negative for hearing loss and tinnitus.    Eyes:  Negative for blurred vision and double vision.   Respiratory:  Negative for cough and shortness of breath.    Cardiovascular:  Negative for chest pain, palpitations and leg swelling.   Gastrointestinal:  Negative for abdominal pain, constipation, diarrhea, nausea and vomiting.   Musculoskeletal:  Negative for joint pain and myalgias.   Neurological:  Negative for weakness and headaches.   Psychiatric/Behavioral:  Positive for depression. The patient is nervous/anxious and has insomnia.        /80 (BP Location: Left arm, Patient Position: Sitting, BP Cuff Size: Adult long)   Pulse 72   Temp 36.8 °C (98.2 °F) (Temporal)   Ht 1.702 m (5' 7\")   Wt 78.9 kg (174 lb)   SpO2 95%   BMI 27.25 kg/m²     PHYSICAL EXAM:  Physical Exam  Constitutional:       General: He is not in acute distress.     Appearance: Normal appearance. He is not ill-appearing.   HENT:      Head: Normocephalic " and atraumatic.      Mouth/Throat:      Mouth: Mucous membranes are moist.   Eyes:      Extraocular Movements: Extraocular movements intact.      Conjunctiva/sclera: Conjunctivae normal.   Cardiovascular:      Rate and Rhythm: Normal rate and regular rhythm.      Heart sounds: Normal heart sounds. No murmur heard.    No friction rub. No gallop.   Pulmonary:      Effort: Pulmonary effort is normal. No respiratory distress.      Breath sounds: Normal breath sounds. No wheezing or rales.   Abdominal:      General: Bowel sounds are normal. There is no distension.      Palpations: Abdomen is soft.      Tenderness: There is no abdominal tenderness.   Musculoskeletal:      Right lower leg: No edema.      Left lower leg: No edema.   Neurological:      General: No focal deficit present.      Mental Status: He is alert and oriented to person, place, and time. Mental status is at baseline.      Cranial Nerves: No cranial nerve deficit.      Sensory: No sensory deficit.      Motor: No weakness.   Psychiatric:         Mood and Affect: Mood normal.         Behavior: Behavior normal.       Assessment and Plan  Severe depression (HCC)  - First diagnosed around 7-8 years ago  - Not been on medications for years  - Recent stressors include divorce from wife  - PHQ-9 score of 20, meeting criteria for severe depression  - Will start Zoloft (25 mg, daily)  - Will also refer to Psychology  - F/u in 7 weeks    Severe anxiety  - ARTIE-7 score of 18, meeting criteria for severe anxiety  - Start Zoloft (25 mg, daily) and will refer to Psychology  - For further details, refer to severe depression above    Insomnia  - Works as , primarily works at night  - Difficulty falling and staying asleep  - Has tried Melatonin in past without success  - Has also tried Trazodone years ago with success  - Will start Trazodone (50 mg, nightly)    - Due to also being on Zoloft, will watch out for any adverse reactions    Seasonal allergic  rhinitis due to pollen  - Has been on Loratadine for years, has led to primary relief  - Continue Loratadine (10 mg, nightly)    Screening for metabolic disorder  - BMI 27.25  - Will check CMP and lipid panel      Return in about 7 weeks (around 3/20/2023) for Lab follow-up.    Signed by: Nicanor Rivas M.D.

## 2023-01-30 NOTE — ASSESSMENT & PLAN NOTE
- Has been on Loratadine for years, has led to primary relief  - Continue Loratadine (10 mg, nightly)

## 2023-01-30 NOTE — ASSESSMENT & PLAN NOTE
- Works as , primarily works at night  - Difficulty falling and staying asleep  - Has tried Melatonin in past without success  - Has also tried Trazodone years ago with success  - Will start Trazodone (50 mg, nightly)    - Due to also being on Zoloft, will watch out for any adverse reactions

## 2023-01-30 NOTE — ASSESSMENT & PLAN NOTE
- ARTIE-7 score of 18, meeting criteria for severe anxiety  - Start Zoloft (25 mg, daily) and will refer to Psychology  - For further details, refer to severe depression above

## 2023-02-19 ENCOUNTER — PATIENT MESSAGE (OUTPATIENT)
Dept: INTERNAL MEDICINE | Facility: OTHER | Age: 38
End: 2023-02-19
Payer: COMMERCIAL

## 2023-02-19 RX ORDER — TRAZODONE HYDROCHLORIDE 50 MG/1
50 TABLET ORAL NIGHTLY
Qty: 30 TABLET | Refills: 1 | Status: CANCELLED | OUTPATIENT
Start: 2023-02-19

## 2023-02-19 RX ORDER — LORATADINE 10 MG/1
10 TABLET ORAL DAILY
Qty: 90 TABLET | Refills: 1 | Status: CANCELLED | OUTPATIENT
Start: 2023-02-19

## 2023-02-19 RX ORDER — SERTRALINE HYDROCHLORIDE 25 MG/1
25 TABLET, FILM COATED ORAL DAILY
Qty: 30 TABLET | Refills: 1 | Status: CANCELLED | OUTPATIENT
Start: 2023-02-19

## 2023-03-04 NOTE — TELEPHONE ENCOUNTER
Called patient to schedule an appt sooner than 03/20 but patient states he is going to wait to see Dr Rivas on 03/20.  He will call us if he changes his mind and wants to be seen sooner.

## 2023-03-20 ENCOUNTER — APPOINTMENT (OUTPATIENT)
Dept: INTERNAL MEDICINE | Facility: OTHER | Age: 38
End: 2023-03-20
Payer: COMMERCIAL

## 2023-03-22 ENCOUNTER — APPOINTMENT (OUTPATIENT)
Dept: INTERNAL MEDICINE | Facility: OTHER | Age: 38
End: 2023-03-22
Payer: COMMERCIAL

## 2023-03-24 ENCOUNTER — APPOINTMENT (OUTPATIENT)
Dept: INTERNAL MEDICINE | Facility: OTHER | Age: 38
End: 2023-03-24
Payer: COMMERCIAL

## 2024-01-16 ENCOUNTER — APPOINTMENT (OUTPATIENT)
Dept: INTERNAL MEDICINE | Facility: OTHER | Age: 39
End: 2024-01-16
Payer: COMMERCIAL

## 2024-01-22 ENCOUNTER — OFFICE VISIT (OUTPATIENT)
Dept: INTERNAL MEDICINE | Facility: OTHER | Age: 39
End: 2024-01-22
Payer: COMMERCIAL

## 2024-01-22 VITALS
HEART RATE: 61 BPM | BODY MASS INDEX: 28.91 KG/M2 | WEIGHT: 184.2 LBS | HEIGHT: 67 IN | TEMPERATURE: 98.2 F | DIASTOLIC BLOOD PRESSURE: 87 MMHG | OXYGEN SATURATION: 95 % | SYSTOLIC BLOOD PRESSURE: 129 MMHG

## 2024-01-22 DIAGNOSIS — F32.2 SEVERE DEPRESSION (HCC): ICD-10-CM

## 2024-01-22 DIAGNOSIS — J30.1 SEASONAL ALLERGIC RHINITIS DUE TO POLLEN: ICD-10-CM

## 2024-01-22 DIAGNOSIS — Z76.0 ENCOUNTER FOR MEDICATION REFILL: ICD-10-CM

## 2024-01-22 DIAGNOSIS — F41.9 SEVERE ANXIETY: ICD-10-CM

## 2024-01-22 DIAGNOSIS — G47.00 INSOMNIA, UNSPECIFIED TYPE: ICD-10-CM

## 2024-01-22 PROCEDURE — 3079F DIAST BP 80-89 MM HG: CPT

## 2024-01-22 PROCEDURE — 3074F SYST BP LT 130 MM HG: CPT

## 2024-01-22 PROCEDURE — 99213 OFFICE O/P EST LOW 20 MIN: CPT | Mod: GE

## 2024-01-22 RX ORDER — SERTRALINE HYDROCHLORIDE 25 MG/1
25 TABLET, FILM COATED ORAL DAILY
Qty: 30 TABLET | Refills: 1 | Status: SHIPPED | OUTPATIENT
Start: 2024-01-22

## 2024-01-22 RX ORDER — TRAZODONE HYDROCHLORIDE 50 MG/1
50 TABLET ORAL NIGHTLY
Qty: 30 TABLET | Refills: 1 | Status: SHIPPED | OUTPATIENT
Start: 2024-01-22

## 2024-01-22 RX ORDER — LORATADINE 10 MG/1
10 TABLET ORAL DAILY
Qty: 90 TABLET | Refills: 1 | Status: SHIPPED | OUTPATIENT
Start: 2024-01-22

## 2024-01-22 ASSESSMENT — ANXIETY QUESTIONNAIRES
2. NOT BEING ABLE TO STOP OR CONTROL WORRYING: MORE THAN HALF THE DAYS
6. BECOMING EASILY ANNOYED OR IRRITABLE: NEARLY EVERY DAY
4. TROUBLE RELAXING: NEARLY EVERY DAY
5. BEING SO RESTLESS THAT IT IS HARD TO SIT STILL: MORE THAN HALF THE DAYS
IF YOU CHECKED OFF ANY PROBLEMS ON THIS QUESTIONNAIRE, HOW DIFFICULT HAVE THESE PROBLEMS MADE IT FOR YOU TO DO YOUR WORK, TAKE CARE OF THINGS AT HOME, OR GET ALONG WITH OTHER PEOPLE: EXTREMELY DIFFICULT
1. FEELING NERVOUS, ANXIOUS, OR ON EDGE: NEARLY EVERY DAY
7. FEELING AFRAID AS IF SOMETHING AWFUL MIGHT HAPPEN: NEARLY EVERY DAY
3. WORRYING TOO MUCH ABOUT DIFFERENT THINGS: SEVERAL DAYS
GAD7 TOTAL SCORE: 17

## 2024-01-22 ASSESSMENT — FIBROSIS 4 INDEX: FIB4 SCORE: 0.28

## 2024-01-22 ASSESSMENT — PATIENT HEALTH QUESTIONNAIRE - PHQ9
CLINICAL INTERPRETATION OF PHQ2 SCORE: 6
5. POOR APPETITE OR OVEREATING: 3 - NEARLY EVERY DAY
SUM OF ALL RESPONSES TO PHQ QUESTIONS 1-9: 23

## 2024-01-22 NOTE — PROGRESS NOTES
Established Patient    Patient Care Team:  Nicanor Rivas M.D. as PCP - General (Internal Medicine)    Jevon Mcclendon is a 38 y.o. male who presents today with the following Chief Complaint(s): Follow up for Diagnoses of Severe depression (HCC), Severe anxiety, Seasonal allergic rhinitis due to pollen, Insomnia, unspecified type, and Encounter for medication refill were pertinent to this visit.    HPI:  Venkat Mcclendon is a 38 year-old male with a past medical history of depression, anxiety and migraine who presents to the clinic today for medication refills as he has been out for a couple of weeks. No acute issues today.     Was on sertraline 25 mg daily for diagnosis of anxiety and depression. PHQ-9 score 23 and ARTIE-7 score 17. Denies any SI or HI. No auditory, visual or tactile hallucinations. No current recreational drug use.     Has difficulty going to sleep and staying asleep. Reports getting 10 hours of sleep in the past 2 weeks. Was previously on trazodone 50 mg. Tried melatonin in the past, but it didn't help with symptoms. Works day shifts. In the past he was working night shifts.     Takes loratadine 10 mg daily for seasonal allergies. Denies any upper respiratory symptoms.     ROS:     General: No fevers, chills, night sweats, weight loss or gain  HEENT: No hearing changes, vision changes, eye pain, ear pain, nasal discharge, sore throat  Neck: No swelling in neck  Pulmonary: No shortness of breath, cough, sputum, or hemoptysis  Cardiovascular: No chest pain, palpitations, or LE swelling  GI: No nausea, vomiting, diarrhea, constipation, abdominal pain, hematochezia or melena  : No dysuria or frequency  Neuro: No focal weakness, no general weakness, no headaches, no lightheadedness, no dizziness  Psych: No anxiety or depression    Past Medical History:   Diagnosis Date    Dental disorder     condition issues    Heart burn     occasional    Migraines     Psychiatric problem     Schizophrenia  "([facility]) 2017    Snoring      Social History     Tobacco Use    Smoking status: Former     Current packs/day: 0.00     Average packs/day: 1 pack/day for 20.0 years (20.0 ttl pk-yrs)     Types: Cigarettes     Start date: 1997     Quit date: 2017     Years since quittin.9    Smokeless tobacco: Current     Types: Snuff, Chew   Vaping Use    Vaping Use: Never used   Substance Use Topics    Alcohol use: No     Alcohol/week: 1.2 oz     Types: 2 Shots of liquor per week     Comment:                           Vape    Drug use: No     Types: Oral     Comment: marijuanna 1-2 times per month. hx-crack, ecstasy quit      Current Outpatient Medications   Medication Sig Dispense Refill    traZODone (DESYREL) 50 MG Tab Take 1 Tablet by mouth every evening. 30 Tablet 1    sertraline (ZOLOFT) 25 MG tablet Take 1 Tablet by mouth every day. 30 Tablet 1    loratadine (CLARITIN) 10 MG Tab Take 1 Tablet by mouth every day. 90 Tablet 1     No current facility-administered medications for this visit.       Physical Exam:  /87 (BP Location: Left arm, Patient Position: Sitting, BP Cuff Size: Adult)   Pulse 61   Temp 36.8 °C (98.2 °F) (Temporal)   Ht 1.702 m (5' 7\")   Wt 83.6 kg (184 lb 3.2 oz)   SpO2 95%   BMI 28.85 kg/m²   General: Well developed, well nourished male, in no distress.  HEENT: NC/AT, PERRL, EOMI, no scleral icterus or conjunctival pallor  CV:RRR, no murmurs gallops or Rubs  Pulm: LCAB, no crackles, rales, rhonchi, or wheezing  MSK:  No lower extremity edema  Neuro: Patient is alert and oriented x3, no focal deficits  Psych: Appropriate mood and affect       Assessment and Plan:   1. Severe depression (HCC)  2. Severe anxiety  PHQ-9 score 23 and ARTIE-7 score 17.   No SI or HI. No auditory, visual or tactile hallucinations.   Not currently on any medications. Previously on sertraline 25 mg.  -After risks/benefits discussion, will restart sertraline 25 mg. Consider increasing if no improvement in " symptoms next visit.   -Discussed benefits of both medications and CBT. Referral to psychiatry placed.   -Will order TSH, CBC and CMP to rule out thyroid issues, electrolytes or anemia.   - Patient has been identified as having a positive depression screening. Appropriate orders and counseling have been given.  - Referral to Psychiatry  - sertraline (ZOLOFT) 25 MG tablet; Take 1 Tablet by mouth every day.  Dispense: 30 Tablet; Refill: 1  - Comp Metabolic Panel; Future  - TSH WITH REFLEX TO FT4; Future  - CBC WITH DIFFERENTIAL; Future  - Follow-up in 6 weeks    3. Seasonal allergic rhinitis due to pollen  Allergies controlled with loratadine 10 mg daily.  -Will restart loratadine 10 mg.   - loratadine (CLARITIN) 10 MG Tab; Take 1 Tablet by mouth every day.  Dispense: 90 Tablet; Refill: 1    4. Insomnia, unspecified type  Difficulty going to sleep and staying asleep.   Previously on trazodone 50 mg.   Failed melatonin in the past  -Discussed sleep hygiene. Handout provided.  -Will restart trazodone 50 mg. If no improvement, can consider increasing next visit.   - traZODone (DESYREL) 50 MG Tab; Take 1 Tablet by mouth every evening.  Dispense: 30 Tablet; Refill: 1  -Follow-up in 6 weeks    5. Encounter for medication refill  Patient requesting refills for sertraline 25 mg, loratadine 10 mg and trazodone 50 mg.  -See plans above  -Refills sent to pharmacy    Return in about 6 weeks (around 3/4/2024) for Short.    Patient Instructions   -Get labs prior to next appointment, nonfasting  -Follow-up in 6 weeks   -I refilled your trazodone, sertraline and loratadine.  -We can follow-up if we need to increase the trazodone and/or sertraline.   -Exercise at least 30 minutes a day and eat plenty of fruits and vegetables in your diet to improve mood.  -Only use bed for sex and sleep. Avoid using phone in bed.   -Referral to psychiatry placed. Check [facility] for scheduling instructions.   -It was nice visiting with you  today!      Joana Durand M.D. PGY-3  [facility]    This note was created using voice recognition software.  While every attempt is made to ensure accuracy of transcription, occasionally errors occur.

## 2024-01-22 NOTE — PATIENT INSTRUCTIONS
-Get labs prior to next appointment, nonfasting  -Follow-up in 6 weeks   -I refilled your trazodone, sertraline and loratadine.  -We can follow-up if we need to increase the trazodone and/or sertraline.   -Exercise at least 30 minutes a day and eat plenty of fruits and vegetables in your diet to improve mood.  -Only use bed for sex and sleep. Avoid using phone in bed.   -Referral to psychiatry placed. Check [facility] for scheduling instructions.   -It was nice visiting with you today!

## 2024-04-09 ENCOUNTER — TELEPHONE (OUTPATIENT)
Dept: INTERNAL MEDICINE | Facility: OTHER | Age: 39
End: 2024-04-09
Payer: COMMERCIAL

## 2024-04-09 NOTE — TELEPHONE ENCOUNTER
LVM for patient to reschedule from missed appointment.  This is the first documentation for no shows

## 2024-05-09 ENCOUNTER — OFFICE VISIT (OUTPATIENT)
Dept: INTERNAL MEDICINE | Facility: OTHER | Age: 39
End: 2024-05-09
Payer: COMMERCIAL

## 2024-05-09 VITALS
OXYGEN SATURATION: 96 % | HEIGHT: 67 IN | BODY MASS INDEX: 30.86 KG/M2 | HEART RATE: 65 BPM | TEMPERATURE: 97.7 F | WEIGHT: 196.6 LBS | SYSTOLIC BLOOD PRESSURE: 121 MMHG | DIASTOLIC BLOOD PRESSURE: 81 MMHG

## 2024-05-09 DIAGNOSIS — Z02.9 ENCOUNTERS FOR ADMINISTRATIVE PURPOSE: ICD-10-CM

## 2024-05-09 DIAGNOSIS — F32.2 SEVERE DEPRESSION (HCC): ICD-10-CM

## 2024-05-09 DIAGNOSIS — J30.1 SEASONAL ALLERGIC RHINITIS DUE TO POLLEN: ICD-10-CM

## 2024-05-09 DIAGNOSIS — G47.00 INSOMNIA, UNSPECIFIED TYPE: ICD-10-CM

## 2024-05-09 DIAGNOSIS — F41.9 SEVERE ANXIETY: ICD-10-CM

## 2024-05-09 DIAGNOSIS — Z13.228 SCREENING FOR METABOLIC DISORDER: ICD-10-CM

## 2024-05-09 PROBLEM — J33.0 POLYP OF NASAL CAVITY: Status: RESOLVED | Noted: 2017-07-19 | Resolved: 2024-05-09

## 2024-05-09 PROBLEM — D14.0: Status: RESOLVED | Noted: 2017-03-15 | Resolved: 2024-05-09

## 2024-05-09 PROCEDURE — 3079F DIAST BP 80-89 MM HG: CPT | Mod: GC

## 2024-05-09 PROCEDURE — 3074F SYST BP LT 130 MM HG: CPT | Mod: GC

## 2024-05-09 PROCEDURE — 99214 OFFICE O/P EST MOD 30 MIN: CPT | Mod: GC

## 2024-05-09 RX ORDER — TRAZODONE HYDROCHLORIDE 50 MG/1
50 TABLET ORAL NIGHTLY
Qty: 90 EACH | Refills: 1 | Status: SHIPPED | OUTPATIENT
Start: 2024-05-09

## 2024-05-09 RX ORDER — LORATADINE 10 MG/1
10 TABLET ORAL DAILY
Qty: 90 TABLET | Refills: 1 | Status: SHIPPED | OUTPATIENT
Start: 2024-05-09

## 2024-05-09 RX ORDER — SERTRALINE HYDROCHLORIDE 100 MG/1
100 TABLET, FILM COATED ORAL
COMMUNITY
Start: 2024-02-26

## 2024-05-09 RX ORDER — SERTRALINE HYDROCHLORIDE 25 MG/1
25 TABLET, FILM COATED ORAL DAILY
Qty: 90 EACH | Refills: 1 | Status: SHIPPED | OUTPATIENT
Start: 2024-05-09 | End: 2024-05-09

## 2024-05-09 ASSESSMENT — ENCOUNTER SYMPTOMS
HEADACHES: 0
HEARTBURN: 0
DEPRESSION: 0
TINGLING: 0
CONSTITUTIONAL NEGATIVE: 1
COUGH: 0
RESPIRATORY NEGATIVE: 1
DIZZINESS: 0
FEVER: 0
WEIGHT LOSS: 0
ABDOMINAL PAIN: 0
SPUTUM PRODUCTION: 0
ORTHOPNEA: 0
PALPITATIONS: 0
HALLUCINATIONS: 0
PSYCHIATRIC NEGATIVE: 1
GASTROINTESTINAL NEGATIVE: 1
MUSCULOSKELETAL NEGATIVE: 1
NEUROLOGICAL NEGATIVE: 1
MYALGIAS: 0
CHILLS: 0
CARDIOVASCULAR NEGATIVE: 1
HEMOPTYSIS: 0
EYES NEGATIVE: 1

## 2024-05-09 ASSESSMENT — FIBROSIS 4 INDEX: FIB4 SCORE: 0.28

## 2024-05-09 NOTE — LETTER
May 13, 2024    To whom it may concern,    I hope this letter finds you well. I am writing on behalf of my patient, Venkat Mcclendon, who has been under our care since January 2023.    Over the course of treatment, it has become evident that traditional therapeutic interventions, while beneficial, are not sufficient to manage  Mr. Mcclendno's symptoms. As such, I am recommending the addition of an Emotional Support Animal (JAGDEEP) to Mr. Mcclendon's treatment plan.    Research has shown that ESAs can provide significant therapeutic benefits for individuals suffering from a variety of conditions. The companionship offered by an JAGDEEP can help alleviate feelings of loneliness and isolation, while the responsibility of caring for an animal can provide structure and purpose. Additionally, the physical contact and unconditional affection that ESAs provide can help reduce stress and anxiety levels, thereby improving overall mood and emotional stability.    For Mr. Mcclendon, I believe that an JAGDEEP would serve as a critical and therapeutic tool, providing comfort, companionship, and a sense of responsibility that can greatly contribute to their mental health and overall well-being.    I kindly request your understanding and cooperation in accommodating Mr. Mcclendon's need for an JAGDEEP as part of their ongoing treatment. This accommodation is in line with the provisions of the Fair Housing Act and the [employer] Act, which protect individuals who require ESAs for mental health support.    Please do not hesitate to contact me if you have any questions or require further information regarding Mr. Mcclendon's condition or the therapeutic benefits of an Emotional Support Animal.      Thank you for your attention to this matter.      Sincerely,      Armando Raul Reyes Yparraguirre, MD

## 2024-05-09 NOTE — PROGRESS NOTES
"    Chief Complaint   Patient presents with    Medication Refill     loratidine    Paperwork     Pt wants a note for service dog for his current dog     HPI: Jevon Mcclendon is a 38 y.o. male here today to reestablish care.  Also, patient requests refill of medications and paperwork detailing needs for emotional support animal.  Chronic conditions include schizophrenia, seasonal allergies, allergic rhinitis, insomnia and severe depression/anxiety.  Patient has been dealing with this chronic conditions for a while, has psychiatrist whom he sees monthly for psychotherapy.      Today, shares that he has been having low mood with no anhedonia or HI/SI, at least 2 days/week.  No clear trigger but the patient is trying to get back with ex with whom he lives.  Main motivation to get better is his kids \"I need to be strong for them\". Receiving medication as instructed by psychiatrist, compliant.  During last session, psychiatrist recommended that patient explore the possibility of getting his dog certified as an emotional support animal given his actual needs.  Patient started the process, comes today requesting a letter detailing emotional needs.    Regarding other chronic conditions, insomnia has not been a problem lately while on trazodone.  Allergic rhinitis still intermittent, appears usually every change of season.  Patient has a dog and a cat who do not seem to be trigger.    Past medical history: Depression/anxiety, schizophrenia, seasonal allergies, insomnia  Family history: Noncontributory  Social history: Lives with 2 kids and ex-wife in an apartment.  Works as a .  No alcohol, chews nicotine gum, did cocaine in younger years for less than a year (quit at least 15 years ago).  Does marijuana consistently.    Vitals:    05/09/24 0858   BP: 121/81   BP Location: Left arm   Patient Position: Sitting   BP Cuff Size: Adult   Pulse: 65   Temp: 36.5 °C (97.7 °F)   TempSrc: Temporal   SpO2: 96% " "  Weight: 89.2 kg (196 lb 9.6 oz)   Height: 1.71 m (5' 7.32\")   Body mass index is 30.5 kg/m².    Review of Systems   Constitutional: Negative.  Negative for chills, fever and weight loss.   HENT: Negative.     Eyes: Negative.    Respiratory: Negative.  Negative for cough, hemoptysis and sputum production.    Cardiovascular: Negative.  Negative for chest pain, palpitations and orthopnea.   Gastrointestinal: Negative.  Negative for abdominal pain and heartburn.   Genitourinary: Negative.  Negative for dysuria.   Musculoskeletal: Negative.  Negative for myalgias.   Skin: Negative.  Negative for rash.   Neurological: Negative.  Negative for dizziness, tingling and headaches.   Endo/Heme/Allergies: Negative.    Psychiatric/Behavioral: Negative.  Negative for depression, hallucinations and suicidal ideas.    All other systems reviewed and are negative.     Physical Exam  Vitals and nursing note reviewed.   Constitutional:       General: He is not in acute distress.     Appearance: Normal appearance. He is not ill-appearing, toxic-appearing or diaphoretic.   Cardiovascular:      Rate and Rhythm: Normal rate and regular rhythm.      Pulses: Normal pulses.      Heart sounds: Normal heart sounds. No murmur heard.  Pulmonary:      Effort: Pulmonary effort is normal. No respiratory distress.      Breath sounds: Normal breath sounds. No stridor. No wheezing or rales.   Abdominal:      General: Bowel sounds are normal. There is no distension.      Palpations: Abdomen is soft.      Tenderness: There is no abdominal tenderness. There is no guarding.   Musculoskeletal:         General: No swelling or tenderness. Normal range of motion.      Right lower leg: No edema.      Left lower leg: No edema.   Skin:     General: Skin is warm.      Capillary Refill: Capillary refill takes less than 2 seconds.      Coloration: Skin is not jaundiced or pale.      Findings: No bruising.   Neurological:      General: No focal deficit present.      " Mental Status: He is alert. Mental status is at baseline.      Cranial Nerves: No cranial nerve deficit.      Sensory: No sensory deficit.      Coordination: Coordination normal.      Gait: Gait normal.      Deep Tendon Reflexes: Reflexes normal.   Psychiatric:         Mood and Affect: Mood normal.         Behavior: Behavior normal.         Thought Content: Thought content normal.         Judgment: Judgment normal.       ASSESSMENT AND PLAN: 38-year-old class I obese male presents today for med refill and requesting letter for emotional support animal.  Patient is already plugged in with psychiatrist and needs refills for trazodone for insomnia, sertraline for depression/anxiety and loratadine for seasonal allergies.  Has been taking medications for long time, no side effects, no recent change in dose.  Followed by psychiatrist monthly.  Overall emotional status will improved significantly if he had emotional support animal.  Also, discussed need for screening for metabolic disorder considering elevated BMI.  Patient agrees.    1. Encounters for administrative purpose: Patient request letter to continue administrative process to certified dog as emotional support animal.  After discussing current emotional state, treatment and environment, patient will benefit from emotional support animal.  Letter will be completed and sent through [employer].    2. Screening for metabolic disorder: Class I obesity per BMI.  Unhealthy diet, chews nicotine.  Discussed importance of screening lab work, patient agrees.  Counseled on lifestyle changes.   - CBC WITH DIFFERENTIAL; Future  - Comp Metabolic Panel; Future  - Lipid Profile; Future  - Hemoglobin A1c; Future    Armando R. Reyes Yparraguirre, M.D.  Internal Medicine Resident   [facility]      This note was generated using voice recognition software which has a small chance of producing errors of grammar and possibly content. I have made every  reasonable attempt to find and correct any obvious errors but expect that some may not be found prior to finalization of this note.

## 2024-08-06 DIAGNOSIS — J30.1 SEASONAL ALLERGIC RHINITIS DUE TO POLLEN: ICD-10-CM

## 2024-08-07 NOTE — TELEPHONE ENCOUNTER
Received request via: Pharmacy    Was the patient seen in the last year in this department? Yes    Does the patient have an active prescription (recently filled or refills available) for medication(s) requested?  yes    Ph[facility] Name: [facility]    Does the patient have FPC Plus and need 100 day supply (blood pressure, diabetes and cholesterol meds only)? Patient does not have SCP

## 2024-08-08 RX ORDER — LORATADINE 10 MG/1
10 TABLET ORAL DAILY
Qty: 90 TABLET | Refills: 1 | Status: SHIPPED | OUTPATIENT
Start: 2024-08-08

## 2024-09-18 DIAGNOSIS — G47.00 INSOMNIA, UNSPECIFIED TYPE: ICD-10-CM

## 2024-09-18 DIAGNOSIS — J30.1 SEASONAL ALLERGIC RHINITIS DUE TO POLLEN: ICD-10-CM

## 2024-09-18 NOTE — TELEPHONE ENCOUNTER
Received request via: Pharmacy    Was the patient seen in the last year in this department? Yes    Does the patient have an active prescription (recently filled or refills available) for medication(s) requested?  yes    Pharmacy Name: [facility]    Does the patient have USP Plus and need 100-day supply? (This applies to ALL medications) Patient does not have SCP

## 2024-09-19 RX ORDER — TRAZODONE HYDROCHLORIDE 50 MG/1
50 TABLET, FILM COATED ORAL NIGHTLY
Qty: 90 TABLET | Refills: 1 | Status: SHIPPED | OUTPATIENT
Start: 2024-09-19

## 2024-09-19 RX ORDER — LORATADINE 10 MG/1
10 TABLET ORAL DAILY
Qty: 90 TABLET | Refills: 1 | Status: SHIPPED | OUTPATIENT
Start: 2024-09-19

## (undated) DEVICE — MASK ANESTHESIA ADULT  - (100/CA)

## (undated) DEVICE — SENSOR SPO2 NEO LNCS ADHESIVE (20/BX) SEE USER NOTES

## (undated) DEVICE — GOWN SURGEONS LARGE - (32/CA)

## (undated) DEVICE — SODIUM CHL IRRIGATION 0.9% 1000ML (12EA/CA)

## (undated) DEVICE — TUBING CLEARLINK DUO-VENT - C-FLO (48EA/CA)

## (undated) DEVICE — SUTURE 4-0 VICRYL PLUS SPAK 12 X 18 (12PK/BX)"

## (undated) DEVICE — HEAD HOLDER JUNIOR/ADULT

## (undated) DEVICE — SUTURE GENERAL

## (undated) DEVICE — KIT  I.V. START (100EA/CA)

## (undated) DEVICE — PACK ENT OR - (2EA/CA)

## (undated) DEVICE — SET LEADWIRE 5 LEAD BEDSIDE DISPOSABLE ECG (1SET OF 5/EA)

## (undated) DEVICE — GLOVE SZ 6.5 BIOGEL PI MICRO - PF LF (50PR/BX)

## (undated) DEVICE — WATER IRRIGATION STERILE 1000ML (12EA/CA)

## (undated) DEVICE — TUBE CONNECTING SUCTION - CLEAR PLASTIC STERILE 72 IN (50EA/CA)

## (undated) DEVICE — NEPTUNE 4 PORT MANIFOLD - (20/PK)

## (undated) DEVICE — CANISTER SUCTION RIGID RED 1500CC (40EA/CA)

## (undated) DEVICE — GLOVE BIOGEL SZ 7.5 SURGICAL PF LTX - (50PR/BX 4BX/CA)

## (undated) DEVICE — ELECTRODE DUAL RETURN W/ CORD - (50/PK)

## (undated) DEVICE — SUTURE 4-0 VICRYL PLUS FS-2 - 27 INCH (36/BX)

## (undated) DEVICE — BOVIE NEEDLE TIP INSULATD NON-SAFETY 2CM (50/PK)

## (undated) DEVICE — MEDICINE CUP STERILE 2 OZ - (100/CA)

## (undated) DEVICE — LEAD SET 6 DISP. EKG NIHON KOHDEN

## (undated) DEVICE — SUCTION INSTRUMENT YANKAUER BULBOUS TIP W/O VENT (50EA/CA)

## (undated) DEVICE — CANISTER SUCTION 3000ML MECHANICAL FILTER AUTO SHUTOFF MEDI-VAC NONSTERILE LF DISP  (40EA/CA)

## (undated) DEVICE — KIT ANESTHESIA W/CIRCUIT & 3/LT BAG W/FILTER (20EA/CA)

## (undated) DEVICE — PROTECTOR ULNA NERVE - (36PR/CA)

## (undated) DEVICE — GLOVE BIOGEL SZ 6.5 SURGICAL PF LTX (50PR/BX 4BX/CA)

## (undated) DEVICE — GLOVE BIOGEL INDICATOR SZ 7SURGICAL PF LTX - (50/BX 4BX/CA)

## (undated) DEVICE — BANDAID X-LARGE 2 X 4 IN LF (50EA/BX)

## (undated) DEVICE — LACTATED RINGERS INJ 1000 ML - (14EA/CA 60CA/PF)

## (undated) DEVICE — GOWN WARMING STANDARD FLEX - (30/CA)

## (undated) DEVICE — SLEEVE, VASO, THIGH, MED

## (undated) DEVICE — TRAY SRGPRP PVP IOD WT PRP - (20/CA)

## (undated) DEVICE — DRAPE MAGNETIC (INSTRA-MAG) - (30/CA)

## (undated) DEVICE — BOVIE FOOT CONTROL SUCTION - 6IN 10FR (25EA/CA)

## (undated) DEVICE — CATHETER IV 20 GA X 1-1/4 ---SURG.& SDS ONLY--- (50EA/BX)

## (undated) DEVICE — PATTIES SURG X-RAYCOTTONOID - 1 X 3 IN (200/CA)

## (undated) DEVICE — ELECTRODE 850 FOAM ADHESIVE - HYDROGEL RADIOTRNSPRNT (50/PK)

## (undated) DEVICE — MASK, LARYNGEAL AIRWAY #4

## (undated) DEVICE — SUTURE 2-0 SILK FS 18 (36PK/BX) DISCONTINUED USE #34812, LOWER UOM SAME PART # WITH G AT THE END"

## (undated) DEVICE — SYRINGE 10 ML CONTROL LL (25EA/BX 4BX/CA)

## (undated) DEVICE — ANTI-FOG SOLUTION - 60BTL/CA

## (undated) DEVICE — SPONGE GAUZE NON-STERILE 2X2 - 4-PLY (200/PK 40PK/CA)